# Patient Record
Sex: FEMALE | Race: WHITE | NOT HISPANIC OR LATINO | Employment: OTHER | URBAN - METROPOLITAN AREA
[De-identification: names, ages, dates, MRNs, and addresses within clinical notes are randomized per-mention and may not be internally consistent; named-entity substitution may affect disease eponyms.]

---

## 2017-01-11 DIAGNOSIS — I10 ESSENTIAL (PRIMARY) HYPERTENSION: ICD-10-CM

## 2017-01-11 DIAGNOSIS — M19.90 OSTEOARTHRITIS: ICD-10-CM

## 2017-01-11 DIAGNOSIS — M54.9 DORSALGIA: ICD-10-CM

## 2017-01-11 DIAGNOSIS — M85.80 OTHER SPECIFIED DISORDERS OF BONE DENSITY AND STRUCTURE, UNSPECIFIED SITE: ICD-10-CM

## 2017-02-02 ENCOUNTER — ALLSCRIPTS OFFICE VISIT (OUTPATIENT)
Dept: OTHER | Facility: OTHER | Age: 72
End: 2017-02-02

## 2017-02-02 LAB
FLUAV AG SPEC QL IA: NEGATIVE
INFLUENZA B AG (HISTORICAL): NEGATIVE

## 2017-02-06 ENCOUNTER — ALLSCRIPTS OFFICE VISIT (OUTPATIENT)
Dept: OTHER | Facility: OTHER | Age: 72
End: 2017-02-06

## 2017-03-20 ENCOUNTER — ALLSCRIPTS OFFICE VISIT (OUTPATIENT)
Dept: OTHER | Facility: OTHER | Age: 72
End: 2017-03-20

## 2017-05-27 ENCOUNTER — GENERIC CONVERSION - ENCOUNTER (OUTPATIENT)
Dept: OTHER | Facility: OTHER | Age: 72
End: 2017-05-27

## 2017-07-31 ENCOUNTER — GENERIC CONVERSION - ENCOUNTER (OUTPATIENT)
Dept: OTHER | Facility: OTHER | Age: 72
End: 2017-07-31

## 2017-08-14 LAB — HCV AB SER-ACNC: NEGATIVE

## 2017-08-21 ENCOUNTER — ALLSCRIPTS OFFICE VISIT (OUTPATIENT)
Dept: OTHER | Facility: OTHER | Age: 72
End: 2017-08-21

## 2017-08-21 DIAGNOSIS — M19.90 OSTEOARTHRITIS: ICD-10-CM

## 2017-08-21 DIAGNOSIS — I10 ESSENTIAL (PRIMARY) HYPERTENSION: ICD-10-CM

## 2017-08-21 DIAGNOSIS — E78.5 HYPERLIPIDEMIA: ICD-10-CM

## 2017-08-21 DIAGNOSIS — F41.9 ANXIETY DISORDER: ICD-10-CM

## 2017-08-21 DIAGNOSIS — M54.9 DORSALGIA: ICD-10-CM

## 2017-10-11 ENCOUNTER — ALLSCRIPTS OFFICE VISIT (OUTPATIENT)
Dept: OTHER | Facility: OTHER | Age: 72
End: 2017-10-11

## 2017-10-18 ENCOUNTER — GENERIC CONVERSION - ENCOUNTER (OUTPATIENT)
Dept: OTHER | Facility: OTHER | Age: 72
End: 2017-10-18

## 2017-11-01 ENCOUNTER — GENERIC CONVERSION - ENCOUNTER (OUTPATIENT)
Dept: OTHER | Facility: OTHER | Age: 72
End: 2017-11-01

## 2017-11-15 ENCOUNTER — GENERIC CONVERSION - ENCOUNTER (OUTPATIENT)
Dept: OTHER | Facility: OTHER | Age: 72
End: 2017-11-15

## 2017-11-29 ENCOUNTER — GENERIC CONVERSION - ENCOUNTER (OUTPATIENT)
Dept: OTHER | Facility: OTHER | Age: 72
End: 2017-11-29

## 2017-12-06 ENCOUNTER — GENERIC CONVERSION - ENCOUNTER (OUTPATIENT)
Dept: INTERNAL MEDICINE CLINIC | Facility: CLINIC | Age: 72
End: 2017-12-06

## 2017-12-26 ENCOUNTER — GENERIC CONVERSION - ENCOUNTER (OUTPATIENT)
Dept: INTERNAL MEDICINE CLINIC | Facility: CLINIC | Age: 72
End: 2017-12-26

## 2018-01-03 ENCOUNTER — GENERIC CONVERSION - ENCOUNTER (OUTPATIENT)
Dept: INTERNAL MEDICINE CLINIC | Age: 73
End: 2018-01-03

## 2018-01-11 NOTE — PROGRESS NOTES
Assessment    1  Benign essential HTN (401 1) (I10)   2  Initial Medicare annual wellness visit (V70 0) (Z00 00)   3  Anxiety (300 00) (F41 9)    Plan  Anxiety    · LORazepam 0 5 MG Oral Tablet; TAKE TABLET Daily as needed  Influenza vaccine needed    · Fluzone Quadrivalent 0 5 ML Intramuscular Suspension Prefilled Syringe    Discussion/Summary    Patient is up to date with screening exams   denies new complaints  vaccines up to date  Impression: Initial Annual Wellness Visit, with preventive exam as well as age and risk appropriate counseling completed  Cardiovascular screening and counseling: counseling was given on maintaining a healthy diet and counseling was given on maintaining a healthy weight  Colorectal cancer screening and counseling: screening is current  Breast cancer screening and counseling: screening is current  Cervical cancer screening and counseling: screening is current  Osteoporosis screening and counseling: screening is current, counseling was given on obtaining adequate amounts of calcium and vitamin D on a daily basis and counseling was given on the importance of regular weightbearing exercise  HIV screening and counseling: screening not indicated  Hepatitis C Screening:  The patient agrees to Hepatitis C screening  Immunizations: the risks and benefits of influenza vaccination were discussed with the patient, the lifetime pneumococcal vaccine has been completed, Zostavax vaccination up to date and Td vaccination up to date  Advance Directive Planning: paperwork and instructions were given to the patient  Advice and education were given regarding skin self-exam       Chief Complaint  Patient is here for AWV exam  Pt does not have any new complains at the moment        History of Present Illness  HPI: patient here for initial AWV - pt is requesting copy to send to her insurance  vaccines up to date - flu vaccine today     mammo and colonoscopy up to date - pt is followed by Dr Glynn Rollins at [facility] for hx of breast cancer and sees her yearly      Welcome to [employer] and Wellness Visits: The patient is being seen for the initial annual wellness visit  Medicare Screening and Risk Factors   Hospitalizations: no previous hospitalizations  Medicare Screening Tests Risk Questions   Osteoporosis risk assessment: , female gender and over 48years of age  Drug and Alcohol Use: The patient is a former cigarette smoker  She has smoked for 1967 year(s) and pt quit smoking in 1968  The patient reports never drinking alcohol  Alcohol concern:   The patient has no concerns about alcohol abuse  She has never used illicit drugs  Diet and Physical Activity: Current diet includes 2 servings of fruit per day, 1 servings of vegetables per day, 1 servings of meat per day, 1-2 servings of whole grains per day, 1-2 servings of dairy products per day, 0 cups of coffee per day, 0 cups of tea per day, 0 cans of regular soda per day and 0 cans of diet soda per day  She exercises 5-6 times per week  Exercise: walking 3-4 hours per week, walks dog in summer, goes to  for exercise classes (silver sneakers/dance class)  Mood Disorder and Cognitive Impairment Screening: PHQ-9 Depression Scale   Over the past 2 weeks, how often have you been bothered by the following problems? 1 ) Little interest or pleasure in doing things? Several days  2 ) Feeling down, depressed or hopeless? Several days  3 ) Trouble falling asleep or sleeping too much? Not at all    4 ) Feeling tired or having little energy? Half the days or more  5 ) Poor appetite or overeating? Several days  6 ) Feeling bad about yourself, or that you are a failure, or have let yourself or your family down? Several days  7 ) Trouble concentrating on things, such as reading a newspaper or watching television? Several days     8 ) Moving or speaking so slowly that other people could have noticed, or the opposite, moving or speaking faster than usual? Not at all  TOTAL SCORE: 7, severity of depression is mild  How difficult have these problems made it for you to do your work, take care of things at home, or get along with people? Not at all  She reports feeling down, depressed, or hopeless over the past two weeks  She reports feeling little interest or pleasure in doing things over the past two weeks  Cognitive impairment screening: difficulty learning/retaining new information, difficulty handling complex tasks, denies difficulty with reasoning, denies difficulty with spatial ability and orientation, denies difficulty with language and denies difficulty with behavior  Functional Ability/Level of Safety: Hearing is slightly decreased, slightly decreased in the right ear, slightly decreased in the left ear and notices worse in setting with background noise  She reports hearing difficulties  She does not use a hearing aid  The patient is currently able to participate in social activities with limitations, but able to do activities of daily living without limitations and able to drive without limitations  Activities of daily living details: does not need help using the phone, no transportation help needed, does not need help shopping, no meal preparation help needed, does not need help doing housework, does not need help doing laundry, does not need help managing medications and does not need help managing money  Injury History: polypharmacy, no alcohol use, no mobility impairment, no antidepressant use, no deconditioning, no postural hypotension, no sedative use, visual impairment, no urinary incontinence, antihypertensive use, no cognitive impairment, up and go test was normal and no previous fall   no falls in the past year  Home safety risk factors:  loose rugs, but no unfamiliar surroundings, no poor household lighting, no uneven floors, no household clutter, grab bars in the bathroom and handrails on the stairs  Advance Directives: Advance directives: will give form today, but no living will  Co-Managers and Medical Equipment/Suppliers: See Patient Care Team   Preventive Quality Program 65 and Older: Falls Risk: The patient fell no falls times in the past 12 months  The patient is currently asymptomatic Symptoms Include: no confusion, no lightheadedness, no vertigo, no syncope, no impaired balance, no visual problems, no leg weakness, no recurring falls and no recent fall  Associated symptoms:  No associated symptoms are reported  Urinary Incontinence Symptoms includes: urinary frequency, but no urinary urgency, no urinary hesitancy, no dysuria and no weak stream    pt relates this to her diuretic  Review of Systems    Constitutional: no fever, no chills, no malaise and no fatigue  Head and Face: no facial pain  Eyes: no eye pain, eyes not red, no watery discharge from the eyes and no itching of the eyes  ENT: no earache and no nasal congestion  Cardiovascular: no chest pain, no palpitations and no lightheadedness  Respiratory: no shortness of breath, no cough and no dry cough  Gastrointestinal: no abdominal pain, no nausea, no diarrhea and no constipation  Genitourinary: urinary frequency and stress urinary incontinence, but no dysuria, no urinary urgency, no flank pain and no foul-smelling vaginal discharge  Musculoskeletal: b/l knee pain, pain R SI joint, but no diffuse joint pain, no generalized muscle aches and no joint swelling  Integumentary and Breasts: no rashes, no skin wound, no edema and no skin ulcer  Neurological: occasional sinus headaches, but no headache, no confusion and no leg weakness  Psychiatric: no insomnia and no anxiety  Endocrine: no night sweats and no muscle weakness  Hematologic and Lymphatic: no swollen glands, no tendency for easy bleeding and no tendency for easy bruising  Yes, the patient is satisfied with Her life     No, the patient has not dropped any activities or interests  Yes, the patient feels life is empty  Point = 1  No, the patient does not often get bored  No, the patient is not hopeful about the future  Point = 1  No, the patient is not bothered by thoughts in their head  Yes, the patient is in good spirits most of the time  No, the patient is not afraid something bad is going to happen to them  Yes, the patient feels happy most of the time  No, the patient does not often feel helpless  No, the patient albright not often get restless and fidgety  No, the patient likes to go out and try new things  No, the patient does not worry about the future  Yes, the patient feels that they have more problems with their memory then most  Point = 1  Yes, the patient thinks its wonderful to be alive now  No, the patient does not feel downhearted or blue  No, the patient does not feel worthless the way they are currently  No, the patient does not find life to be very exciting  Point = 1  No, the patient does not worry a lot about the past    No, it is not hard for the patient to get started on new projects  Yes, the patient feels full of energy  No, the patient does not feel that most people are better off then they are  No, the patient does not frequently get upset about the little things  No, the patient does not frequently feel like crying  Yes, the patient finds it hard to concentrate  Point = 1  Yes, the patient enjoys getting up in the morning  No, the patient enjoys social gatherings  Yes, the patient finds it easy to make decisions  No, the patient does not feel thier mind is clear as it used to be  Point = 1  Score 6   Normal 0 - 9, Mild Depression 10 - 19, Severe Depression 20 - 30      Active Problems    1  Abrasion of right cornea, sequela (906 2) (S05 01XS)   2  Allergy desensitization therapy (V07 1) (Z51 6)   3  Allergy to cats (477 8) (J30 81)   4  Allergy to dogs (477 2) (J30 81)   5   Allergy to mold (V15 09) (Z91 048)   6  Allergy to trees (477 0) (J30 1)   7  Anxiety (300 00) (F41 9)   8  Benign essential HTN (401 1) (I10)   9  Chronic back pain (724 5,338 29) (M54 9,G89 29)   10  Chronic interstitial cystitis (595 1) (N30 10)   11  Chronic sinusitis (473 9) (J32 9)   12  Deviated nasal septum (470) (J34 2)   13  Hot flashes (782 62) (R23 2)   14  Hyperlipidemia (272 4) (E78 5)   15  Hypokalemia (276 8) (E87 6)   16  Multiple allergies (V15 09) (Z88 9)   17  Osteoarthritis (715 90) (M19 90)   18  Osteopenia (733 90) (M85 80)   19  Spondylosis of lumbosacral joint without myelopathy (721 3) (M47 817)    Past Medical History    · History of Bilateral low back pain without sciatica (724 2) (M54 5)   · History of Chronically dry eyes, bilateral (375 15) (T22 349)   · History of Disorder of bone and cartilage (733 90) (M89 9,M94 9)   · History of Dyspareunia, female (625 0) (N94 10)   · History of Eczema, unspecified type (692 9) (L30 9)   · History of Environmental allergies (V15 09) (Z91 09)   · History of acute sinusitis (V12 69) (Z87 09)   · History of chest pain (V13 89) (H36 910)   · History of colonic polyps (V12 72) (Z86 010)   · History of esophageal reflux (V12 79) (Z87 19)   · History of fever (V13 89) (M51 997)   · History of hemorrhoids (V13 89) (Z87 19)   · History of malignant neoplasm of breast (V10 3) (Z85 3)   · History of pelvic inflammatory disease (V13 29) (Z87 42)   · History of senile atrophic vaginitis (V13 29) (Z87 42)   · History of shortness of breath (V13 89) (T26 309)   · History of uterine leiomyoma (V13 29) (Z86 018)   · History of varicella (V12 09) (Z86 19)   · 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)   · History of Immunotherapy   · History of Palpitations (785 1) (R00 2)   · History of Sacroiliac pain (724 6) (M53 3)    The active problems and past medical history were reviewed and updated today        Surgical History    · History of Breast Reconstruction With Implant Prosthesis Right Breast   · History of Hemorrhoidectomy   · History of Hysterectomy   · History of Mastectomy Right Breast    The surgical history was reviewed and updated today  Family History  Mother    · Family history of ischemic heart disease (V17 3) (Z80 55)  Father    · Family history of ischemic heart disease (V17 3) (Z82 49)   · Family history of kidney disease (V22 75) (Z80 3)  Sister    · Family history of malignant neoplasm of uterus (V16 49) (Z80 49)  Maternal Grandmother    · Family history of liver disease (V18 59) (Z83 79)  Paternal Aunt    · Family history of colon cancer (V16 0) (Z80 0)    The family history was reviewed and updated today  Social History    · Former smoker (Z93 37) (P76 003)   · smoked for 1 year at age 24   · No alcohol use   · No illicit drug use  The social history was reviewed and updated today  Current Meds   1  Allegra-D 24 Hour 180-240 MG TB24; take 1 tablet daily as needed; Therapy: (Recorded:10Oct2017) to Recorded   2  Artificial Tears 1 4 % Ophthalmic Solution; apply 4-6 times daily; Therapy: (Recorded:10Oct2017) to Recorded   3  Aspirin 81 MG TABS; Take 1 tablet daily; Therapy: (Recorded:19Oct2016) to Recorded   4  Azo Tabs 95 MG Oral Tablet; Take 1 tablet daily; Therapy: (Recorded:50Iuw1876) to Recorded   5  B-6 100 MG Oral Tablet; Take 1 tablet daily; Therapy: 29Qmq7504 to Recorded   6  Biotin Maximum Strength 78446 MCG Oral Tablet; TAKE 1 TABLET DAILY; Therapy: (Recorded:10Oct2017) to Recorded   7  Calcium 500 MG TABS; take 2 tablet daily; Therapy: (Recorded:11Oct2017) to Recorded   8  Sera-C/Bioflavonoids TABS; TAKE 1 TABLET DAILY; Therapy: (Recorded:11Evk6510) to Recorded   9  Fluticasone Propionate 50 MCG/ACT Nasal Suspension; USE 2 SPRAYS IN EACH   NOSTRIL ONCE DAILY; Therapy: (Recorded:56Wxe0254) to Recorded   10  Gabapentin 100 MG Oral Capsule; TAKE 2 CAPSULE Daily; Therapy: (Recorded:10Oct2017) to Recorded   11  Glucosamine-Chondroitin 750-600 MG Oral Tablet Chewable; CHEW 1 TABLET DAILY; Therapy: (Recorded:19Oct2016) to Recorded   12  LORazepam 0 5 MG Oral Tablet; TAKE TABLET Daily as needed; Last Rx:73Bwj2941    Ordered   13  Meloxicam 7 5 MG Oral Tablet; Take 1 tablet daily  Requested for: 99BAM2233; Last    Rx:07Auv8902 Ordered   14  Metoprolol Tartrate 25 MG Oral Tablet; take 0 5 tablet daily  Requested for: 95VTS0351;    Last Rx:17Jia1024 Ordered   15  Multi Vitamin/Minerals Oral Tablet; TAKE 1 TABLET DAILY; Therapy: (Recorded:19Oct2016) to Recorded   16  Olopatadine HCl - 0 6 % Nasal Solution; INSTILL 2 SPRAY Daily each nostril  Requested    for: 31VBC7472; Last Rx:23Jan2017 Ordered   17  Omega 3-6-9 Fatty Acids CAPS; TAKE 1000 MG Daily; Therapy: (Recorded:19Oct2016) to Recorded   18  Premarin CREA; USE AS DIRECTED; Therapy: (Recorded:10Oct2017) to Recorded   19  Probiotic CAPS; take 1 capsule daily; Therapy: (Recorded:10Oct2017) to Recorded   20  Promethazine-DM 6 25-15 MG/5ML Oral Syrup; SWALLOW 5 ML 4 times daily as needed    for cough from allergies  Requested for: 97Ijn5854; Last Rx:02Msa1905 Ordered   21  Red Yeast Rice 600 MG Oral Capsule; take 1 capsule daily; Therapy: (Recorded:19Oct2016) to Recorded   22  Restasis 0 05 % Ophthalmic Emulsion; INSTILL 1 DROP IN [facility] EYE TWICE DAILY; Therapy: (Recorded:11Oct2017) to Recorded   23  Systane GEL; Apply at bedtime; Therapy: (Recorded:11Oct2017) to Recorded   24  Triamterene-HCTZ 75-50 MG Oral Tablet; Take 1 tablet daily  Requested for: 96MVJ7614;    Last Rx:99Qal9254 Ordered   25  Vitamin D3 2000 UNIT Oral Tablet; Take 1 tablet daily; Therapy: (Recorded:11Oct2017) to Recorded   26  Vitamin E 400 UNIT Oral Capsule; take 1 capsule daily; Therapy: (Recorded:19Oct2016) to Recorded    The medication list was reviewed and updated today  Allergies    1  diltiazem   2  moxifloxacin   3  Penicillins   4  predniSONE   5  sulfa   6  Tetracyclines   7  Trimpex   8  Avelox TABS    9  Shellfish   10  Animal dander - Cats   11  Animal dander - Dogs   12  Grass   13  Mold   14  Ragweed   15  Seasonal   16  Trees    Immunizations   1 2    Influenza  Temporarily Deferred: Pt requests deferral, Received at [facility] Sept 2016, As of: [id number], Defer for 1 Years 29-Sep-2015    PCV  22-Jun-2015     PPSV  01-Jan-2007     Tetanus  21-Oct-2008     Zoster  10-Ezequiel-2009      Vitals  Signs    Temperature: 98 6 F, Oral  Heart Rate: 80  Systolic: 547, LUE, Sitting  Diastolic: 80, LUE, Sitting  Height: 4 ft 0 5 in  Weight: 171 lb   BMI Calculated: 51 11  BSA Calculated: 1 5  O2 Saturation: 97    Physical Exam    Constitutional   General appearance: No acute distress, well appearing and well nourished  Eyes   Conjunctiva and lids: No swelling, erythema or discharge  Ears, Nose, Mouth, and Throat   External inspection of ears and nose: Normal     Pulmonary   Respiratory effort: No increased work of breathing or signs of respiratory distress  Auscultation of lungs: Clear to auscultation  Cardiovascular   Auscultation of heart: Normal rate and rhythm, normal S1 and S2, without murmurs  Examination of extremities for edema and/or varicosities: Normal     Musculoskeletal   Gait and station: Normal     Inspection/palpation of joints, bones, and muscles: Normal     Skin   Skin and subcutaneous tissue: Normal without rashes or lesions  Psychiatric   Orientation to person, place, and time: Normal     Mood and affect: Normal        Results/Data  Falls Risk Assessment (Dx Z13 89 Screen for Neurologic Disorder) 11Oct2017 02:19PM User, Tanium     Test Name Result Flag Reference   Falls Risk      No falls in the past year     PHQ-2 Adult Depression Screening 67QJB4025 02:19PM User, Tanium     Test Name Result Flag Reference   PHQ-2 Adult Depression Score 0     Over the last two weeks, how often have you been bothered by any of the following problems?   Little interest or pleasure in doing things: Not at all - 0  Feeling down, depressed, or hopeless: Not at all - 0   PHQ-2 Adult Depression Screening Negative         Health Management  Depression screening   PHevery-2 Adult Depression Screening; every 1 year; Last 30PLB7276; Next Due:  29PDH9455; Active  History of Screening for genitourinary condition   *VB - Urinary Incontinence Screen (Dx Z13 89 Screen for UI); every 1 year; Last  19Vyp7499; Next Due: 49Agj5730; Active  History of Screening for malignant neoplasm of breast   * MAMMO SCREENING BILATERAL W CAD; every 1 year; Last 78Zzd4122; Next Due:  80Gvt3197; Overdue  History of Screening for neurological condition   Falls Risk Assessment (Dx Z13 89 Screen for Neurologic Disorder); every 1 year; Last  43SRC2874; Next Due: 39VSF1449; Active  Osteoarthritis   * DXA BONE DENSITY SPINE HIP AND PELVIS; every 2 years; Last 39Rbs7321; Next  Due: 63SFF0147; Active  Osteopenia   * DXA BONE DENSITY SPINE HIP AND PELVIS; every 2 years; Last 90Ift1102; Next  Due: 61MAS8095; Active  Screening for malignant neoplasm of colon   COLONOSCOPY; every 5 years; Last 73QCR1138; Next Due: 27DQV3745; Active    Future Appointments    Date/Time Provider Specialty Site   02/05/2018 02:15 PM Deandra Chandler MD Internal Medicine [facility]   10/16/2017 02:45  [address] [place] [address], Nurse Schedule  [facility]     Signatures   Electronically signed by : Darren Garcia, [facility]; Oct 11 2017  3:05PM EST                       (Author)    Electronically signed by :  Linda Marroquin MD; Oct 12 2017  8:41AM EST                       (Author)

## 2018-01-12 VITALS
TEMPERATURE: 98.4 F | HEIGHT: 55 IN | HEART RATE: 76 BPM | DIASTOLIC BLOOD PRESSURE: 62 MMHG | SYSTOLIC BLOOD PRESSURE: 122 MMHG | BODY MASS INDEX: 26.01 KG/M2 | OXYGEN SATURATION: 98 % | WEIGHT: 112.38 LBS

## 2018-01-13 VITALS
OXYGEN SATURATION: 97 % | WEIGHT: 171 LBS | SYSTOLIC BLOOD PRESSURE: 124 MMHG | BODY MASS INDEX: 39.57 KG/M2 | DIASTOLIC BLOOD PRESSURE: 80 MMHG | HEIGHT: 55 IN | TEMPERATURE: 98.6 F | HEART RATE: 80 BPM

## 2018-01-13 VITALS
HEIGHT: 59 IN | BODY MASS INDEX: 22.88 KG/M2 | TEMPERATURE: 99.1 F | DIASTOLIC BLOOD PRESSURE: 72 MMHG | OXYGEN SATURATION: 95 % | HEART RATE: 92 BPM | SYSTOLIC BLOOD PRESSURE: 112 MMHG | WEIGHT: 113.5 LBS

## 2018-01-14 VITALS
SYSTOLIC BLOOD PRESSURE: 108 MMHG | OXYGEN SATURATION: 95 % | TEMPERATURE: 98.4 F | DIASTOLIC BLOOD PRESSURE: 72 MMHG | BODY MASS INDEX: 26.93 KG/M2 | HEIGHT: 55 IN | WEIGHT: 116.38 LBS | HEART RATE: 84 BPM

## 2018-01-14 VITALS
HEART RATE: 84 BPM | WEIGHT: 114 LBS | OXYGEN SATURATION: 96 % | SYSTOLIC BLOOD PRESSURE: 120 MMHG | TEMPERATURE: 98.3 F | HEIGHT: 55 IN | DIASTOLIC BLOOD PRESSURE: 60 MMHG | BODY MASS INDEX: 26.38 KG/M2

## 2018-01-18 ENCOUNTER — GENERIC CONVERSION - ENCOUNTER (OUTPATIENT)
Dept: INTERNAL MEDICINE CLINIC | Facility: CLINIC | Age: 73
End: 2018-01-18

## 2018-01-25 LAB
25(OH)D3 SERPL-MCNC: 57 NG/ML (ref 30–100)
BUN SERPL-MCNC: 26 MG/DL (ref 7–25)
BUN/CREAT SERPL: 25 (CALC) (ref 6–22)
CALCIUM SERPL-MCNC: 9.7 MG/DL (ref 8.6–10.4)
CHLORIDE SERPL-SCNC: 97 MMOL/L (ref 98–110)
CO2 SERPL-SCNC: 31 MMOL/L (ref 20–31)
CREAT SERPL-MCNC: 1.03 MG/DL (ref 0.6–0.93)
ERYTHROCYTE [DISTWIDTH] IN BLOOD BY AUTOMATED COUNT: 11.9 % (ref 11–15)
GLUCOSE SERPL-MCNC: 72 MG/DL (ref 65–99)
HCT VFR BLD AUTO: 39.4 % (ref 35–45)
HGB BLD-MCNC: 12.9 G/DL (ref 11.7–15.5)
MCH RBC QN AUTO: 28.9 PG (ref 27–33)
MCHC RBC AUTO-ENTMCNC: 32.7 G/DL (ref 32–36)
MCV RBC AUTO: 88.1 FL (ref 80–100)
PLATELET # BLD AUTO: 232 THOUSAND/UL (ref 140–400)
PMV BLD REES-ECKER: 11 FL (ref 7.5–12.5)
POTASSIUM SERPL-SCNC: 3.5 MMOL/L (ref 3.5–5.3)
RBC # BLD AUTO: 4.47 MILLION/UL (ref 3.8–5.1)
SL AMB EGFR AFRICAN AMERICAN: 63 ML/MIN/1.73M2
SL AMB EGFR NON AFRICAN AMERICAN: 55 ML/MIN/1.73M2
SODIUM SERPL-SCNC: 138 MMOL/L (ref 135–146)
TSH SERPL-ACNC: 1.48 MIU/L (ref 0.4–4.5)
WBC # BLD AUTO: 7.7 THOUSAND/UL (ref 3.8–10.8)

## 2018-01-31 ENCOUNTER — CLINICAL SUPPORT (OUTPATIENT)
Dept: INTERNAL MEDICINE CLINIC | Facility: CLINIC | Age: 73
End: 2018-01-31
Payer: COMMERCIAL

## 2018-01-31 DIAGNOSIS — J30.81 ALLERGIC TO ANIMAL DANDER: ICD-10-CM

## 2018-01-31 DIAGNOSIS — J30.1 ALLERGY TO TREES: ICD-10-CM

## 2018-01-31 DIAGNOSIS — Z91.048 ALLERGY TO MOLD: ICD-10-CM

## 2018-01-31 DIAGNOSIS — Z88.9 MULTIPLE ALLERGIES: ICD-10-CM

## 2018-01-31 PROCEDURE — 95117 IMMUNOTHERAPY INJECTIONS: CPT | Performed by: INTERNAL MEDICINE

## 2018-02-02 RX ORDER — LORAZEPAM 0.5 MG/1
TABLET ORAL DAILY
COMMUNITY
End: 2018-02-05 | Stop reason: SDUPTHER

## 2018-02-02 RX ORDER — LANOLIN ALCOHOL/MO/W.PET/CERES
50 CREAM (GRAM) TOPICAL 3 TIMES WEEKLY
COMMUNITY
End: 2018-08-01

## 2018-02-02 RX ORDER — CHOLECALCIFEROL (VITAMIN D3) 125 MCG
1 CAPSULE ORAL DAILY
COMMUNITY

## 2018-02-02 RX ORDER — MONTELUKAST SODIUM 10 MG/1
TABLET ORAL
COMMUNITY
Start: 2017-03-20 | End: 2018-08-01

## 2018-02-02 RX ORDER — PHENAZOPYRIDINE HYDROCHLORIDE 95 MG/1
1 TABLET ORAL DAILY
COMMUNITY

## 2018-02-02 RX ORDER — FLUTICASONE PROPIONATE 50 MCG
2 SPRAY, SUSPENSION (ML) NASAL DAILY
COMMUNITY

## 2018-02-02 RX ORDER — PYRIDOXINE HCL (VITAMIN B6) 100 MG
1 TABLET ORAL DAILY
COMMUNITY
Start: 2017-08-21 | End: 2018-02-28 | Stop reason: DRUGHIGH

## 2018-02-02 RX ORDER — DIAZEPAM 10 MG/1
TABLET ORAL
COMMUNITY
Start: 2017-11-21 | End: 2019-06-26 | Stop reason: CLARIF

## 2018-02-02 RX ORDER — POLYVINYL ALCOHOL 14 MG/ML
SOLUTION/ DROPS OPHTHALMIC
COMMUNITY
End: 2018-08-01

## 2018-02-02 RX ORDER — BIOTIN 10 MG
1 TABLET ORAL DAILY
COMMUNITY
End: 2018-02-28

## 2018-02-02 RX ORDER — TIZANIDINE 2 MG/1
2 TABLET ORAL EVERY 8 HOURS PRN
Refills: 0 | COMMUNITY
Start: 2017-12-07 | End: 2018-02-28 | Stop reason: ALTCHOICE

## 2018-02-02 RX ORDER — DEXTROMETHORPHAN HYDROBROMIDE AND PROMETHAZINE HYDROCHLORIDE 15; 6.25 MG/5ML; MG/5ML
SYRUP ORAL 4 TIMES DAILY PRN
COMMUNITY
End: 2019-06-26 | Stop reason: ALTCHOICE

## 2018-02-02 RX ORDER — CRANBERRY FRUIT EXTRACT 200 MG
1 CAPSULE ORAL DAILY
COMMUNITY

## 2018-02-02 RX ORDER — TRIAMTERENE AND HYDROCHLOROTHIAZIDE 75; 50 MG/1; MG/1
1 TABLET ORAL
COMMUNITY
Start: 2016-12-12 | End: 2018-02-05 | Stop reason: SDUPTHER

## 2018-02-02 RX ORDER — GABAPENTIN 100 MG/1
100 CAPSULE ORAL DAILY
COMMUNITY

## 2018-02-02 RX ORDER — DIAZEPAM 5 MG/1
TABLET ORAL
COMMUNITY
Start: 2017-11-21 | End: 2018-08-01

## 2018-02-02 RX ORDER — MELOXICAM 7.5 MG/1
1 TABLET ORAL DAILY
COMMUNITY
End: 2018-06-20 | Stop reason: SDUPTHER

## 2018-02-02 RX ORDER — VITAMIN E 268 MG
1 CAPSULE ORAL DAILY
COMMUNITY
End: 2020-12-02 | Stop reason: ALTCHOICE

## 2018-02-02 RX ORDER — CALCIUM CARBONATE 500(1250)
500 TABLET ORAL DAILY
COMMUNITY

## 2018-02-02 RX ORDER — CYCLOSPORINE 0.5 MG/ML
0.05 EMULSION OPHTHALMIC EVERY 12 HOURS
COMMUNITY
Start: 2013-12-06 | End: 2018-11-05 | Stop reason: SDUPTHER

## 2018-02-02 RX ORDER — CHOLECALCIFEROL (VITAMIN D3) 50 MCG
4 CAPSULE ORAL DAILY
COMMUNITY

## 2018-02-02 RX ORDER — OLOPATADINE HYDROCHLORIDE 665 UG/1
SPRAY NASAL DAILY
COMMUNITY
End: 2018-08-01

## 2018-02-02 RX ORDER — CYCLOSPORINE 0.5 MG/ML
1 EMULSION OPHTHALMIC 2 TIMES DAILY
COMMUNITY
End: 2018-08-01

## 2018-02-02 RX ORDER — VERAPAMIL HYDROCHLORIDE 40 MG/1
20 TABLET ORAL DAILY
Refills: 3 | COMMUNITY
Start: 2017-12-06

## 2018-02-05 ENCOUNTER — OFFICE VISIT (OUTPATIENT)
Dept: INTERNAL MEDICINE CLINIC | Facility: CLINIC | Age: 73
End: 2018-02-05
Payer: COMMERCIAL

## 2018-02-05 VITALS
DIASTOLIC BLOOD PRESSURE: 78 MMHG | HEART RATE: 86 BPM | WEIGHT: 113 LBS | HEIGHT: 59 IN | TEMPERATURE: 98.5 F | BODY MASS INDEX: 22.78 KG/M2 | SYSTOLIC BLOOD PRESSURE: 134 MMHG | OXYGEN SATURATION: 98 %

## 2018-02-05 DIAGNOSIS — F41.9 ANXIETY: ICD-10-CM

## 2018-02-05 DIAGNOSIS — I10 ESSENTIAL HYPERTENSION: ICD-10-CM

## 2018-02-05 DIAGNOSIS — E78.01 FAMILIAL HYPERCHOLESTEROLEMIA: Primary | ICD-10-CM

## 2018-02-05 PROCEDURE — 99214 OFFICE O/P EST MOD 30 MIN: CPT | Performed by: INTERNAL MEDICINE

## 2018-02-05 RX ORDER — LORAZEPAM 0.5 MG/1
0.5 TABLET ORAL ONCE
Qty: 30 TABLET | Refills: 0 | Status: SHIPPED | OUTPATIENT
Start: 2018-02-05 | End: 2018-04-24 | Stop reason: SDUPTHER

## 2018-02-05 RX ORDER — TRIAMTERENE AND HYDROCHLOROTHIAZIDE 75; 50 MG/1; MG/1
1 TABLET ORAL ONCE
Qty: 90 TABLET | Refills: 0 | Status: SHIPPED | OUTPATIENT
Start: 2018-02-05 | End: 2018-04-24 | Stop reason: SDUPTHER

## 2018-02-05 NOTE — PROGRESS NOTES
Assessment/Plan:  Hypertension   blood pressure is stable so will continue the present dose of antihypertensive medicine  Will check renal function test before next visit       mild hyperlipidemia     patient is taking over-the-counter red yeast rice  Last lipid profile was within normal limit  Will recommend to repeat  lipid profile before next visit     Diagnoses and all orders for this visit:    Familial hypercholesterolemia  -     triamterene-hydrochlorothiazide (MAXZIDE) 75-50 MG per tablet; Take 1 tablet by mouth once for 1 dose    Essential hypertension    Anxiety  -     LORazepam (ATIVAN) 0 5 mg tablet; Take 1 tablet (0 5 mg total) by mouth once for 1 dose    Other orders  -     Fexofenadine-Pseudoephedrine (ALLEGRA-D 24 HOUR PO); Take 1 tablet by mouth daily as needed  -     polyvinyl alcohol (LIQUIFILM TEARS) 1 4 % ophthalmic solution; Apply to eye  -     aspirin 81 MG tablet; Take 1 tablet by mouth daily  -     phenazopyridine (PYRIDIUM) 95 MG tablet; Take 1 tablet by mouth daily  -     Pyridoxine HCl (VITAMIN B-6) 100 MG TABS; Take 1 tablet by mouth daily  -     Biotin (BIOTIN MAXIMUM STRENGTH) 10 MG TABS; Take 1 tablet by mouth daily  -     Calcium 500 MG tablet; Take 2 tablets by mouth daily  -     Carboxymethylcellulose Sod PF 1 % ophthalmic solution; 1 drop  -     Cholecalciferol 1000 units capsule; Take 1,500 Units by mouth  -     conjugated estrogens (PREMARIN) vaginal cream; Apply a pea size amount of the estrogen cream to the opening of the vagina three nights per week  -     cycloSPORINE (RESTASIS) 0 05 % ophthalmic emulsion; Apply 0 05 % to eye  -     diazepam (VALIUM) 10 mg tablet; 10 mg compounded suppositoriesPlace 1 vaginally daily prn  -     diazepam (VALIUM) 5 mg tablet; 5 mg compounded suppositoriesPlace 1 vaginally 1-2 daily  -     fluticasone (FLONASE) 50 mcg/act nasal spray; 2 sprays into each nostril daily  -     gabapentin (NEURONTIN) 100 mg capsule;  Take 2 capsules by mouth daily  -     Glucosamine-Chondroitin 750-600 MG CHEW; Chew 1 tablet daily  -     Discontinue: LORazepam (ATIVAN) 0 5 mg tablet; Take by mouth Daily  -     meloxicam (MOBIC) 7 5 mg tablet; Take 1 tablet by mouth daily  -     Discontinue: metoprolol tartrate (LOPRESSOR) 25 mg tablet; Take 0 5 tablets by mouth daily  -     montelukast (SINGULAIR) 10 mg tablet; TK 1 T PO D  -     Multiple Vitamins-Minerals (MULTI VITAMIN/MINERALS) TABS; Take 1 tablet by mouth daily  -     Olopatadine HCl 0 6 % SOLN; into each nostril Daily  -     HM OMEGA-3-6-9 FATTY ACIDS CAPS; Take by mouth Daily  -     Probiotic Product (PROBIOTIC-10) CAPS; Take 1 capsule by mouth daily  -     promethazine-dextromethorphan (PHENERGAN-DM) 6 25-15 mg/5 mL oral syrup; Take by mouth 4 times daily  -     Red Yeast Rice Extract (GNP RED YEAST RICE) 600 MG CAPS; Take 1 capsule by mouth daily  -     pyridoxine (VITAMIN B6) 50 mg tablet; Take 50 mg by mouth 3 (three) times a week    -     cycloSPORINE (RESTASIS) 0 05 % ophthalmic emulsion; Apply 1 drop to eye 2 (two) times a day  -     Polyethyl Glycol-Propyl Glycol (SYSTANE) 0 4-0 3 % GEL; Apply to eye  -     tiZANidine (ZANAFLEX) 2 mg tablet; Take 2 mg by mouth every 8 (eight) hours as needed  -     Discontinue: triamterene-hydrochlorothiazide (MAXZIDE) 75-50 MG per tablet; Take 1 tablet by mouth  -     verapamil (CALAN) 40 mg tablet;   -     Cholecalciferol (VITAMIN D3) 2000 units TABS; Take 1 tablet by mouth daily  -     vitamin E, tocopherol, 400 units capsule; Take 1 capsule by mouth daily          Subjective:  No new complaints         Patient ID: Bentley Ordaz is a 71 y o  female  Hypertension   This is a chronic problem  The current episode started more than 1 year ago  Pertinent negatives include no anxiety, chest pain, headaches, neck pain, palpitations, peripheral edema or shortness of breath  There are no associated agents to hypertension   There are no known risk factors for coronary artery disease  Past treatments include calcium channel blockers  The current treatment provides significant improvement  There is no history of angina, PVD or retinopathy  Identifiable causes of hypertension include chronic renal disease  The following portions of the patient's history were reviewed and updated as appropriate: allergies, current medications, past family history, past medical history, past social history, past surgical history and problem list     Review of Systems   Constitutional: Negative for fatigue and fever  HENT: Negative for congestion, ear discharge, ear pain, postnasal drip, sinus pressure, sore throat, tinnitus and trouble swallowing  Eyes: Negative for discharge, itching and visual disturbance  Respiratory: Negative for cough and shortness of breath  Cardiovascular: Negative for chest pain and palpitations  Gastrointestinal: Negative for abdominal pain, diarrhea, nausea and vomiting  Endocrine: Negative for cold intolerance and polyuria  Genitourinary: Negative for difficulty urinating, dysuria and urgency  Musculoskeletal: Negative for arthralgias and neck pain  Skin: Negative for rash  Allergic/Immunologic: Negative for environmental allergies  Neurological: Negative for dizziness, weakness and headaches  Psychiatric/Behavioral: The patient is not nervous/anxious  No past medical history on file        Current Outpatient Prescriptions:     aspirin 81 MG tablet, Take 1 tablet by mouth daily, Disp: , Rfl:     Biotin (BIOTIN MAXIMUM STRENGTH) 10 MG TABS, Take 1 tablet by mouth daily, Disp: , Rfl:     Calcium 500 MG tablet, Take 2 tablets by mouth daily, Disp: , Rfl:     Carboxymethylcellulose Sod PF 1 % ophthalmic solution, 1 drop, Disp: , Rfl:     Cholecalciferol (VITAMIN D3) 2000 units TABS, Take 1 tablet by mouth daily, Disp: , Rfl:     Cholecalciferol 1000 units capsule, Take 1,500 Units by mouth, Disp: , Rfl:     conjugated estrogens (PREMARIN) vaginal cream, Apply a pea size amount of the estrogen cream to the opening of the vagina three nights per week , Disp: , Rfl:     cycloSPORINE (RESTASIS) 0 05 % ophthalmic emulsion, Apply 0 05 % to eye, Disp: , Rfl:     cycloSPORINE (RESTASIS) 0 05 % ophthalmic emulsion, Apply 1 drop to eye 2 (two) times a day, Disp: , Rfl:     diazepam (VALIUM) 5 mg tablet, 5 mg compounded suppositoriesPlace 1 vaginally 1-2 daily, Disp: , Rfl:     Fexofenadine-Pseudoephedrine (ALLEGRA-D 24 HOUR PO), Take 1 tablet by mouth daily as needed, Disp: , Rfl:     fluticasone (FLONASE) 50 mcg/act nasal spray, 2 sprays into each nostril daily, Disp: , Rfl:     gabapentin (NEURONTIN) 100 mg capsule, Take 2 capsules by mouth daily, Disp: , Rfl:     Glucosamine-Chondroitin 750-600 MG CHEW, Chew 1 tablet daily, Disp: , Rfl:     HM OMEGA-3-6-9 FATTY ACIDS CAPS, Take by mouth Daily, Disp: , Rfl:     LORazepam (ATIVAN) 0 5 mg tablet, Take 1 tablet (0 5 mg total) by mouth once for 1 dose, Disp: 30 tablet, Rfl: 0    meloxicam (MOBIC) 7 5 mg tablet, Take 1 tablet by mouth daily, Disp: , Rfl:     Multiple Vitamins-Minerals (MULTI VITAMIN/MINERALS) TABS, Take 1 tablet by mouth daily, Disp: , Rfl:     Olopatadine HCl 0 6 % SOLN, into each nostril Daily, Disp: , Rfl:     phenazopyridine (PYRIDIUM) 95 MG tablet, Take 1 tablet by mouth daily, Disp: , Rfl:     Polyethyl Glycol-Propyl Glycol (SYSTANE) 0 4-0 3 % GEL, Apply to eye, Disp: , Rfl:     polyvinyl alcohol (LIQUIFILM TEARS) 1 4 % ophthalmic solution, Apply to eye, Disp: , Rfl:     Probiotic Product (PROBIOTIC-10) CAPS, Take 1 capsule by mouth daily, Disp: , Rfl:     promethazine-dextromethorphan (PHENERGAN-DM) 6 25-15 mg/5 mL oral syrup, Take by mouth 4 times daily, Disp: , Rfl:     pyridoxine (VITAMIN B6) 50 mg tablet, Take 50 mg by mouth 3 (three) times a week  , Disp: , Rfl:     Red Yeast Rice Extract (GNP RED YEAST RICE) 600 MG CAPS, Take 1 capsule by mouth daily, Disp: , Rfl:     triamterene-hydrochlorothiazide (MAXZIDE) 75-50 MG per tablet, Take 1 tablet by mouth once for 1 dose, Disp: 90 tablet, Rfl: 0    verapamil (CALAN) 40 mg tablet, , Disp: , Rfl: 3    vitamin E, tocopherol, 400 units capsule, Take 1 capsule by mouth daily, Disp: , Rfl:     diazepam (VALIUM) 10 mg tablet, 10 mg compounded suppositoriesPlace 1 vaginally daily prn, Disp: , Rfl:     montelukast (SINGULAIR) 10 mg tablet, TK 1 T PO D, Disp: , Rfl:     Pyridoxine HCl (VITAMIN B-6) 100 MG TABS, Take 1 tablet by mouth daily, Disp: , Rfl:     tiZANidine (ZANAFLEX) 2 mg tablet, Take 2 mg by mouth every 8 (eight) hours as needed, Disp: , Rfl: 0    Allergies   Allergen Reactions    Diltiazem Other (See Comments)     Reaction Date: [id number]; Category: Adverse Reaction;     Dog Epithelium Allergy Skin Test     Molds & Smuts     Other Other (See Comments)     Reaction Date: [id number]; Category: Adverse Reaction;     Penicillins Other (See Comments)     Reaction Date: [id number]; Category: Allergy;     Pollen Extract      Category: Allergy;     Shellfish-Derived Products     Short Ragweed Pollen Ext     Tree Extract     Moxifloxacin Rash     Reaction Date: [id number]; Category: Adverse Reaction;   Category: Adverse Reaction;     Prednisone Rash     Reaction Date: [id number]; Category: Allergy;     Sulfa Antibiotics Rash and Other (See Comments)     Reaction Date: [id number]; Category: Allergy; Reaction Date: [id number]; Category: Allergy;     Trimethoprim Rash     Other reaction(s): Contact Dermatitis  Reaction Date: [id number]; Category: Adverse Reaction;        Social History   No past surgical history on file  No family history on file  Objective:  /78 (BP Location: Left arm, Patient Position: Sitting, Cuff Size: Standard)   Pulse 86   Temp 98 5 °F (36 9 °C)   Ht 4' 10 5" (1 486 m)   Wt 51 3 kg (113 lb)   SpO2 98%   BMI 23 22 kg/m²   Body mass index is 23 22 kg/m²       Physical Exam Constitutional: She appears well-developed  HENT:   Head: Normocephalic  Right Ear: External ear normal    Left Ear: External ear normal    Mouth/Throat: Oropharynx is clear and moist    Eyes: Pupils are equal, round, and reactive to light  No scleral icterus  Neck: Normal range of motion  Neck supple  No tracheal deviation present  No thyromegaly present  Cardiovascular: Normal rate, regular rhythm and normal heart sounds  Pulmonary/Chest: Effort normal and breath sounds normal  No respiratory distress  She exhibits no tenderness  Abdominal: Soft  Bowel sounds are normal  She exhibits no mass  There is no tenderness  Musculoskeletal: Normal range of motion  Lymphadenopathy:     She has no cervical adenopathy  Neurological: She is alert  No cranial nerve deficit  Skin: Skin is warm  No erythema  Psychiatric: She has a normal mood and affect   Her behavior is normal

## 2018-02-20 LAB
BUN SERPL-MCNC: 17 MG/DL (ref 7–25)
BUN/CREAT SERPL: 18 (CALC) (ref 6–22)
CALCIUM SERPL-MCNC: 9.6 MG/DL (ref 8.6–10.4)
CHLORIDE SERPL-SCNC: 99 MMOL/L (ref 98–110)
CHOLEST SERPL-MCNC: 237 MG/DL
CHOLEST/HDLC SERPL: 2.2 (CALC)
CO2 SERPL-SCNC: 31 MMOL/L (ref 20–31)
CREAT SERPL-MCNC: 0.94 MG/DL (ref 0.6–0.93)
GLUCOSE SERPL-MCNC: 71 MG/DL (ref 65–99)
HDLC SERPL-MCNC: 109 MG/DL
LDLC SERPL CALC-MCNC: 109 MG/DL (CALC)
NONHDLC SERPL-MCNC: 128 MG/DL (CALC)
POTASSIUM SERPL-SCNC: 3.6 MMOL/L (ref 3.5–5.3)
SL AMB EGFR AFRICAN AMERICAN: 71 ML/MIN/1.73M2
SL AMB EGFR NON AFRICAN AMERICAN: 61 ML/MIN/1.73M2
SODIUM SERPL-SCNC: 139 MMOL/L (ref 135–146)
TRIGL SERPL-MCNC: 101 MG/DL

## 2018-02-28 ENCOUNTER — OFFICE VISIT (OUTPATIENT)
Dept: INTERNAL MEDICINE CLINIC | Facility: CLINIC | Age: 73
End: 2018-02-28
Payer: COMMERCIAL

## 2018-02-28 VITALS
HEART RATE: 83 BPM | BODY MASS INDEX: 22.5 KG/M2 | OXYGEN SATURATION: 98 % | SYSTOLIC BLOOD PRESSURE: 128 MMHG | TEMPERATURE: 98.6 F | HEIGHT: 59 IN | DIASTOLIC BLOOD PRESSURE: 70 MMHG | WEIGHT: 111.6 LBS

## 2018-02-28 DIAGNOSIS — I10 BENIGN ESSENTIAL HTN: ICD-10-CM

## 2018-02-28 DIAGNOSIS — J32.1 CHRONIC FRONTAL SINUSITIS: Primary | ICD-10-CM

## 2018-02-28 DIAGNOSIS — J34.2 DEVIATED NASAL SEPTUM: ICD-10-CM

## 2018-02-28 DIAGNOSIS — M54.50 CHRONIC BILATERAL LOW BACK PAIN WITHOUT SCIATICA: ICD-10-CM

## 2018-02-28 DIAGNOSIS — G89.29 CHRONIC BILATERAL LOW BACK PAIN WITHOUT SCIATICA: ICD-10-CM

## 2018-02-28 DIAGNOSIS — J30.81 ALLERGY TO ANIMAL DANDER: ICD-10-CM

## 2018-02-28 DIAGNOSIS — J30.1 ALLERGY TO TREES: ICD-10-CM

## 2018-02-28 DIAGNOSIS — N95.2 ATROPHIC VAGINITIS: ICD-10-CM

## 2018-02-28 DIAGNOSIS — M54.50 CHRONIC RIGHT-SIDED LOW BACK PAIN WITHOUT SCIATICA: ICD-10-CM

## 2018-02-28 DIAGNOSIS — F41.9 ANXIETY: ICD-10-CM

## 2018-02-28 DIAGNOSIS — N30.10 CHRONIC INTERSTITIAL CYSTITIS: ICD-10-CM

## 2018-02-28 DIAGNOSIS — G89.29 CHRONIC RIGHT-SIDED LOW BACK PAIN WITHOUT SCIATICA: ICD-10-CM

## 2018-02-28 PROBLEM — J32.9 CHRONIC SINUSITIS: Status: ACTIVE | Noted: 2017-01-18

## 2018-02-28 PROCEDURE — 99213 OFFICE O/P EST LOW 20 MIN: CPT | Performed by: INTERNAL MEDICINE

## 2018-02-28 PROCEDURE — 3078F DIAST BP <80 MM HG: CPT | Performed by: INTERNAL MEDICINE

## 2018-02-28 PROCEDURE — 3074F SYST BP LT 130 MM HG: CPT | Performed by: INTERNAL MEDICINE

## 2018-02-28 RX ORDER — AZITHROMYCIN 250 MG/1
500 TABLET, FILM COATED ORAL EVERY 24 HOURS
Refills: 0 | Status: CANCELLED | OUTPATIENT
Start: 2018-02-28

## 2018-02-28 NOTE — PROGRESS NOTES
Assessment/Plan:    1  Chronic Sinusitis  She is currently having sinus headaches without any ear pain  She reports she is very congested in her frontal nasal passage and has a lot of PND  Since there is no sinus pain and no unusual drainage, we feel antibiotics will not be helpful in this situation  We instructed her to use saline nasal rinse 3-4 times a day  She should also use steroid spray and nasal spray daily  She was instructed to follow up with her ENT specialist for local solutions  2  Hyperlipidemia  Her cholesterol was elevated at 237 on 2/19/18  Since her HDL was quite high, no new medications were indicated  She was advised to minimize usage of red meat in her diet and to switch to a more plant-based diet  3  Visual problems  She has bilateral cataracts and dry eyes  She follows up with her eye doctor for evaluation and treatment  4  Health maintenance  She will follow up with us in 4 weeks  Diagnoses and all orders for this visit:    Chronic frontal sinusitis    Deviated nasal septum    Benign essential HTN    Atrophic vaginitis    Chronic interstitial cystitis    Allergy to animal dander    Allergy to trees    Anxiety    Chronic bilateral low back pain without sciatica    Chronic right-sided low back pain without sciatica    Other orders  -     Cancel: azithromycin (ZITHROMAX) 250 mg tablet; Take 2 tablets (500 mg total) by mouth every 24 hours          Subjective:      Patient ID: Livan Clemens is a 71 y o  female  Sharyn Laura is a 71year old female who presents today for evaluation of a sinus infection  She is currently having sinus headaches without any ear pain  She reports she is very congested in her frontal nasal passage and has a lot of PND  She was retested for allergy serums by her ENT specialist and had a reaction at a very low dosage of the allergy serum  She reports she is allergic to many things including dogs, 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   Because of her many allergies, she takes 1/3rd tablet of Allegra-D only when going to work because [address] causes her sinus headaches and increases her blood pressure  She uses Flonase and saline nasal rinse daily in the morning and another nasal spray nightly  She reports that she has been treating her current episode of chronic sinusitis by zinc lozenges and chewing elderberries without any beneficial effects  She reports she has cataracts in her eyes  She also reports extremely dry eyes for which she uses tear drops  She notes she still has lower right back pain for which she takes Meloxicam and uses pain patches  She has interstitial cystitis which causes her pelvic pain and polyuria  She follows with her uro-gynecologist for this  The following portions of the patient's history were reviewed and updated as appropriate: allergies, current medications, past family history, past medical history, past social history, past surgical history and problem list     Review of Systems   Constitutional: Positive for activity change and fever  Negative for fatigue  HENT: Positive for congestion, facial swelling, postnasal drip, rhinorrhea, sinus pain and sinus pressure  Negative for ear pain, hearing loss, nosebleeds, sneezing and sore throat  Eyes: Positive for redness (allergic) and visual disturbance (cataracts)  Negative for pain  Respiratory: Negative  Cardiovascular: Positive for palpitations  Negative for chest pain and leg swelling  Gastrointestinal: Negative  Genitourinary: Positive for frequency, pelvic pain and urgency  Negative for difficulty urinating, dysuria and flank pain (interstial cystites)  Musculoskeletal: Positive for back pain and joint swelling (knees)  Negative for gait problem and myalgias  Skin: Negative  Neurological: Negative for dizziness, seizures, syncope, speech difficulty, light-headedness and numbness  Psychiatric/Behavioral: The patient is nervous/anxious  Objective:      /70 (BP Location: Left arm, Patient Position: Sitting, Cuff Size: Standard)   Pulse 83   Temp 98 6 °F (37 °C) (Oral)   Ht 4' 10 5" (1 486 m)   Wt 50 6 kg (111 lb 9 6 oz)   SpO2 98%   BMI 22 93 kg/m²          Physical Exam   Constitutional: She is oriented to person, place, and time  She appears well-developed and well-nourished  She appears distressed  HENT:   Head: Normocephalic and atraumatic  Eyes: Conjunctivae and EOM are normal  Pupils are equal, round, and reactive to light  Neck: Normal range of motion  Neck supple  No thyromegaly present  Cardiovascular: Normal rate, regular rhythm and normal heart sounds  No murmur heard  Pulmonary/Chest: Effort normal and breath sounds normal    Abdominal: Soft  Bowel sounds are normal  She exhibits mass  She exhibits no distension  There is no tenderness  There is no rebound and no guarding  Musculoskeletal: She exhibits no edema, tenderness or deformity  Neurological: She is alert and oriented to person, place, and time  She has normal reflexes  Skin: Skin is warm and dry  Psychiatric: She has a normal mood and affect  By signing my name below, Patricia Alexis attest that this documentation has been prepared under the direction and in the presence of Dr Nir Hemphill MD  Electronically signed: Irene Bryant  2/28/18    Provider Attestation:  I, Dr Nir Hemphill, personally performed the services described in this documentation  All medical record entries made by the irene were at my direction and in my presence  I have reviewed the chart and discharge instructions (if applicable) and agree that the record reflects my personal performance and is accurate and complete  Dr Nir Hemphill MD  2/28/18

## 2018-02-28 NOTE — PATIENT INSTRUCTIONS
1  She was instructed to use saline nasal rinse 3-4 times a day  She should also use steroid spray and nasal spray daily  2  She was instructed to follow up with her ENT specialist for local solutions  3  She was advised to minimize usage of red meat in her diet and to switch to a more plant-based diet  4  She will follow up with us in 4 weeks

## 2018-04-10 ENCOUNTER — OFFICE VISIT (OUTPATIENT)
Dept: INTERNAL MEDICINE CLINIC | Age: 73
End: 2018-04-10
Payer: COMMERCIAL

## 2018-04-10 ENCOUNTER — TELEPHONE (OUTPATIENT)
Dept: INTERNAL MEDICINE CLINIC | Facility: CLINIC | Age: 73
End: 2018-04-10

## 2018-04-10 VITALS
SYSTOLIC BLOOD PRESSURE: 118 MMHG | HEART RATE: 72 BPM | OXYGEN SATURATION: 97 % | DIASTOLIC BLOOD PRESSURE: 70 MMHG | WEIGHT: 113.6 LBS | BODY MASS INDEX: 22.9 KG/M2 | HEIGHT: 59 IN | TEMPERATURE: 98.5 F

## 2018-04-10 DIAGNOSIS — H66.001 ACUTE SUPPURATIVE OTITIS MEDIA OF RIGHT EAR WITHOUT SPONTANEOUS RUPTURE OF TYMPANIC MEMBRANE, RECURRENCE NOT SPECIFIED: Primary | ICD-10-CM

## 2018-04-10 PROBLEM — R23.2 HOT FLASHES: Status: ACTIVE | Noted: 2017-10-10

## 2018-04-10 PROBLEM — S05.01XA RIGHT CORNEAL ABRASION: Status: ACTIVE | Noted: 2017-06-07

## 2018-04-10 PROBLEM — H04.129 DRY EYE: Status: ACTIVE | Noted: 2017-10-11

## 2018-04-10 PROCEDURE — 99213 OFFICE O/P EST LOW 20 MIN: CPT | Performed by: NURSE PRACTITIONER

## 2018-04-10 RX ORDER — AZITHROMYCIN 250 MG/1
TABLET, FILM COATED ORAL
Qty: 6 TABLET | Refills: 0 | Status: SHIPPED | OUTPATIENT
Start: 2018-04-10 | End: 2018-04-15

## 2018-04-10 NOTE — TELEPHONE ENCOUNTER
I called [facility] in [place] to verify how Rx was last written and who was the ordering  Rx was last written by a Dr Shabbir Swann for a 90 day supply and 1 refill  Pt still has a refill remaining which she can have filled on 4/15/2018  When I searched the doctor I saw he was a GYN with [address]  Pt has an appt with you today at 3:15PM for [facility]  This is an FYI, just in case she brings it up  She does not have a f/u scheduled with Dr Kacey Jones to discuss if he would be willing to fill the med  Thank you!

## 2018-04-10 NOTE — PATIENT INSTRUCTIONS
You have been prescribed an antibiotic today  Take the full course of prescription  Recommend taking with food as may upset your stomach  Also would recommend OTC probiotic or yogurt to help protect the natural brenna of your stomach  Rest   Stay well hydrated  Return for new/worsening symptoms

## 2018-04-10 NOTE — TELEPHONE ENCOUNTER
Pt l/m on [facility] Rx line asking if we could start fille her Gabapentin 100 mg, take 2 caps qhs  Med was previously filled by a physician she claims is no longer in practice

## 2018-04-10 NOTE — PROGRESS NOTES
Assessment/Plan:    No problem-specific Assessment & Plan notes found for this encounter  Diagnoses and all orders for this visit:    Acute suppurative otitis media of right ear without spontaneous rupture of tympanic membrane, recurrence not specified      You have been prescribed an antibiotic today  Take the full course of prescription  Recommend taking with food as may upset your stomach  Also would recommend OTC probiotic or yogurt to help protect the natural brenna of your stomach  Rest   Stay well hydrated  Return for new/worsening symptoms  Subjective:      Patient ID: Antoinette Ling is a 71 y o  female  Patient presents for an acute visit  She reports that she has a right-sided ear ache for the past 5 days  She had a cold last week and the ear pain followed  She also reports that she has right sided throat pain, as well  She feels that this is related to her ear pain  Earache    There is pain in the right ear  This is a new problem  The current episode started in the past 7 days  The problem occurs every few minutes  The problem has been waxing and waning  There has been no fever  The pain is moderate  Associated symptoms include coughing (patient reports that she has seasonal allergies), headaches (sinus headaches at times), rhinorrhea and a sore throat  Pertinent negatives include no abdominal pain, diarrhea, ear discharge, hearing loss, neck pain or vomiting  Treatments tried: allegra D  The treatment provided mild relief  There is no history of a chronic ear infection, hearing loss or a tympanostomy tube  Sore Throat    This is a new problem  The problem has been waxing and waning  The pain is worse on the right side  Associated symptoms include congestion, coughing (patient reports that she has seasonal allergies), ear pain, headaches (sinus headaches at times), a hoarse voice, a plugged ear sensation and swollen glands   Pertinent negatives include no abdominal pain, diarrhea, ear discharge, neck pain, shortness of breath, trouble swallowing or vomiting  The following portions of the patient's history were reviewed and updated as appropriate: allergies, current medications, past family history, past medical history, past social history, past surgical history and problem list     Review of Systems   Constitutional: Positive for fatigue  Negative for chills and fever  HENT: Positive for congestion, ear pain, hoarse voice, postnasal drip, rhinorrhea and sore throat  Negative for ear discharge, hearing loss, sinus pain, sinus pressure and trouble swallowing  Respiratory: Positive for cough (patient reports that she has seasonal allergies)  Negative for shortness of breath and wheezing  Cardiovascular: Negative for chest pain  Gastrointestinal: Negative for abdominal pain, diarrhea, nausea and vomiting  Genitourinary: Negative for dysuria  Musculoskeletal: Negative for neck pain  Skin: Negative for wound  Neurological: Positive for headaches (sinus headaches at times)  Psychiatric/Behavioral: The patient is nervous/anxious (at baseline)  History reviewed  No pertinent past medical history        Current Outpatient Prescriptions:     aspirin 81 MG tablet, Take 1 tablet by mouth daily, Disp: , Rfl:     Calcium 500 MG tablet, Take 2 tablets by mouth daily, Disp: , Rfl:     Carboxymethylcellulose Sod PF 1 % ophthalmic solution, 1 drop, Disp: , Rfl:     Cholecalciferol (VITAMIN D3) 2000 units TABS, Take 1 tablet by mouth daily, Disp: , Rfl:     conjugated estrogens (PREMARIN) vaginal cream, Apply a pea size amount of the estrogen cream to the opening of the vagina three nights per week , Disp: , Rfl:     cycloSPORINE (RESTASIS) 0 05 % ophthalmic emulsion, Apply 0 05 % to eye, Disp: , Rfl:     cycloSPORINE (RESTASIS) 0 05 % ophthalmic emulsion, Apply 1 drop to eye 2 (two) times a day, Disp: , Rfl:     diazepam (VALIUM) 10 mg tablet, 10 mg compounded suppositoriesPlace 1 vaginally daily prn, Disp: , Rfl:     diazepam (VALIUM) 5 mg tablet, 5 mg compounded suppositoriesPlace 1 vaginally 1-2 daily, Disp: , Rfl:     fluticasone (FLONASE) 50 mcg/act nasal spray, 2 sprays into each nostril daily, Disp: , Rfl:     gabapentin (NEURONTIN) 100 mg capsule, Take 2 capsules by mouth daily, Disp: , Rfl:     Glucosamine-Chondroitin 750-600 MG CHEW, Chew 1 tablet daily, Disp: , Rfl:     HM OMEGA-3-6-9 FATTY ACIDS CAPS, Take by mouth Daily, Disp: , Rfl:     meloxicam (MOBIC) 7 5 mg tablet, Take 1 tablet by mouth daily, Disp: , Rfl:     montelukast (SINGULAIR) 10 mg tablet, TK 1 T PO D, Disp: , Rfl:     Multiple Vitamins-Minerals (MULTI VITAMIN/MINERALS) TABS, Take 1 tablet by mouth daily, Disp: , Rfl:     Olopatadine HCl 0 6 % SOLN, into each nostril Daily, Disp: , Rfl:     phenazopyridine (PYRIDIUM) 95 MG tablet, Take 1 tablet by mouth daily, Disp: , Rfl:     Polyethyl Glycol-Propyl Glycol (SYSTANE) 0 4-0 3 % GEL, Apply to eye, Disp: , Rfl:     polyvinyl alcohol (LIQUIFILM TEARS) 1 4 % ophthalmic solution, Apply to eye, Disp: , Rfl:     Probiotic Product (PROBIOTIC-10) CAPS, Take 1 capsule by mouth daily, Disp: , Rfl:     promethazine-dextromethorphan (PHENERGAN-DM) 6 25-15 mg/5 mL oral syrup, Take by mouth 4 times daily, Disp: , Rfl:     pyridoxine (VITAMIN B6) 50 mg tablet, Take 50 mg by mouth 3 (three) times a week  , Disp: , Rfl:     Red Yeast Rice Extract (GNP RED YEAST RICE) 600 MG CAPS, Take 1 capsule by mouth daily, Disp: , Rfl:     verapamil (CALAN) 40 mg tablet, , Disp: , Rfl: 3    vitamin E, tocopherol, 400 units capsule, Take 1 capsule by mouth daily, Disp: , Rfl:     Cholecalciferol 1000 units capsule, Take 1,500 Units by mouth, Disp: , Rfl:     LORazepam (ATIVAN) 0 5 mg tablet, Take 1 tablet (0 5 mg total) by mouth once for 1 dose, Disp: 30 tablet, Rfl: 0    triamterene-hydrochlorothiazide (MAXZIDE) 75-50 MG per tablet, Take 1 tablet by mouth once for 1 dose, Disp: 90 tablet, Rfl: 0    Allergies   Allergen Reactions    Diltiazem Other (See Comments)     Reaction Date: [id number]; Category: Adverse Reaction;     Dog Epithelium Allergy Skin Test     Molds & Smuts     Other Other (See Comments)     Reaction Date: [id number]; Category: Adverse Reaction;     Penicillins Other (See Comments)     Reaction Date: [id number]; Category: Allergy;     Pollen Extract      Category: Allergy;     Shellfish-Derived Products     Short Ragweed Pollen Ext     Tetracycline     Tree Extract     Moxifloxacin Rash     Reaction Date: [id number]; Category: Adverse Reaction;   Category: Adverse Reaction;     Prednisone Rash     Reaction Date: [id number]; Category: Allergy;     Sulfa Antibiotics Rash and Other (See Comments)     Reaction Date: [id number]; Category: Allergy; Reaction Date: [id number]; Category: Allergy;     Trimethoprim Rash     Other reaction(s): Contact Dermatitis  Reaction Date: [id number]; Category: Adverse Reaction;        Social History   History reviewed  No pertinent surgical history  History reviewed  No pertinent family history  Objective:  /70 (BP Location: Left arm, Patient Position: Sitting, Cuff Size: Standard)   Pulse 72   Temp 98 5 °F (36 9 °C) (Tympanic)   Ht 4' 10 5" (1 486 m)   Wt 51 5 kg (113 lb 9 6 oz)   SpO2 97%   BMI 23 34 kg/m²        Physical Exam   Constitutional: She is oriented to person, place, and time  She appears well-developed and well-nourished  No distress  HENT:   Head: Normocephalic and atraumatic  Right Ear: External ear normal  Tympanic membrane is erythematous and bulging  Left Ear: External ear normal  Tympanic membrane is not erythematous and not bulging  Mouth/Throat: Oropharynx is clear and moist    Eyes: Conjunctivae are normal  Pupils are equal, round, and reactive to light  No scleral icterus  Neck: Normal range of motion  Neck supple  No thyromegaly present  Cardiovascular: Normal rate, regular rhythm and normal heart sounds  Pulmonary/Chest: Effort normal and breath sounds normal  No respiratory distress  Abdominal: Soft  Bowel sounds are normal  She exhibits no distension  Musculoskeletal: Normal range of motion  She exhibits no edema  Neurological: She is alert and oriented to person, place, and time  Skin: Skin is warm and dry  Psychiatric: She has a normal mood and affect  Her behavior is normal  Judgment and thought content normal    Vitals reviewed

## 2018-04-24 ENCOUNTER — OFFICE VISIT (OUTPATIENT)
Dept: INTERNAL MEDICINE CLINIC | Age: 73
End: 2018-04-24
Payer: COMMERCIAL

## 2018-04-24 VITALS
WEIGHT: 113.8 LBS | TEMPERATURE: 98 F | BODY MASS INDEX: 23.89 KG/M2 | OXYGEN SATURATION: 98 % | RESPIRATION RATE: 16 BRPM | HEART RATE: 73 BPM | SYSTOLIC BLOOD PRESSURE: 130 MMHG | DIASTOLIC BLOOD PRESSURE: 70 MMHG | HEIGHT: 58 IN

## 2018-04-24 DIAGNOSIS — E78.01 FAMILIAL HYPERCHOLESTEROLEMIA: ICD-10-CM

## 2018-04-24 DIAGNOSIS — J30.1 ALLERGY TO TREES: ICD-10-CM

## 2018-04-24 DIAGNOSIS — J32.1 CHRONIC FRONTAL SINUSITIS: Primary | ICD-10-CM

## 2018-04-24 DIAGNOSIS — F41.9 ANXIETY: ICD-10-CM

## 2018-04-24 DIAGNOSIS — H92.02 EARACHE ON LEFT: ICD-10-CM

## 2018-04-24 PROCEDURE — 99213 OFFICE O/P EST LOW 20 MIN: CPT | Performed by: NURSE PRACTITIONER

## 2018-04-24 RX ORDER — TRIAMTERENE AND HYDROCHLOROTHIAZIDE 75; 50 MG/1; MG/1
1 TABLET ORAL ONCE
Qty: 90 TABLET | Refills: 0 | Status: SHIPPED | OUTPATIENT
Start: 2018-04-24 | End: 2018-06-18 | Stop reason: SDUPTHER

## 2018-04-24 RX ORDER — CLARITHROMYCIN 500 MG/1
500 TABLET, COATED ORAL EVERY 12 HOURS SCHEDULED
Qty: 28 TABLET | Refills: 0 | Status: CANCELLED | OUTPATIENT
Start: 2018-04-24 | End: 2018-05-08

## 2018-04-24 RX ORDER — LORAZEPAM 0.5 MG/1
0.5 TABLET ORAL ONCE
Qty: 30 TABLET | Refills: 0 | Status: SHIPPED | OUTPATIENT
Start: 2018-04-24 | End: 2018-05-16 | Stop reason: SDUPTHER

## 2018-04-24 NOTE — PROGRESS NOTES
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  She is receiving allergy testing at this time and allergy shots  Patient has seen ENT in the past however she has not been back recently  Patient's main complaint was postnasal drip and mild ear pain  Advised patient to use Flonase and an over-the-counter antihistamine however patient states that antihistamines increase her blood pressure and make her eyes dry so she does not want to take it  Will advise patient to continue with her nasal sprays, also advised patient to either follow up with her allergist or her ENT about her postnasal drip and ear pain  Earache on left    Patient has been recently treated with azithromycin  She presents today with earache  Patient is here does not seem to be infected at this time, seems to be related to her allergies with some fluid behind the ears  Advised patient that antihistamine in conjunction with Flonase would help drain her ears  Also recommended Coricidin HBP as a decongestant to help dry up sinuses  Patient advised to use warm salt water rinses for sore throat  And stay hydrated to flush sinuses  Diagnoses and all orders for this visit:    Chronic frontal sinusitis    Allergy to trees    Familial hypercholesterolemia  -     triamterene-hydrochlorothiazide (MAXZIDE) 75-50 MG per tablet; Take 1 tablet by mouth once for 1 dose    Anxiety  -     LORazepam (ATIVAN) 0 5 mg tablet; Take 1 tablet (0 5 mg total) by mouth once for 1 dose    Earache on left    Other orders  -     Cancel: clarithromycin (BIAXIN) 500 mg tablet; Take 1 tablet (500 mg total) by mouth every 12 (twelve) hours for 14 days          Subjective:      Patient ID: Hudson Bustamante is a 71 y o  female  Patient presents today for follow-up on ear infection      Patient was seen on 04/10/2018 and was given a Z-Enoc for her ear infection of the right ear  Of note patient currently uses Flonase and Olopatadine at night  Pt  States that antihistamines do not work for her  Pt  Sees has an appointment to get allergy shots  She sees ENT about once a year  She stated her ear felt better and then it started to get worse again  Pt  Has history of chronic sinus issues  She took 1/2 of allergia D today  Pt  Works at [employer] and has allergies to dog and cats  Earache    There is pain in the right ear  This is a recurrent problem  The current episode started 1 to 4 weeks ago (two weeks)  The problem occurs constantly  There has been no fever  The pain is at a severity of 3/10  The pain is mild  Associated symptoms include coughing (dry cough), ear discharge, headaches (sinus headaches at times), rhinorrhea and a sore throat  Pertinent negatives include no diarrhea or vomiting  She has tried antibiotics for the symptoms  The treatment provided mild relief  The following portions of the patient's history were reviewed and updated as appropriate: allergies, current medications, past family history, past medical history, past social history, past surgical history and problem list     Review of Systems   Constitutional: Negative for appetite change, chills, fatigue and fever  HENT: Positive for ear discharge, ear pain (right ear), postnasal drip, rhinorrhea and sore throat  Negative for sinus pain and sinus pressure  Eyes: Negative for pain, discharge and itching  Respiratory: Positive for cough (dry cough)  Negative for chest tightness and shortness of breath  Cardiovascular: Negative for chest pain and palpitations  Gastrointestinal: Negative for blood in stool, diarrhea, nausea and vomiting  Genitourinary: Negative for dysuria, hematuria and urgency  Allergic/Immunologic: Positive for environmental allergies  Neurological: Positive for headaches (sinus headaches at times)           Past Medical History:   Diagnosis Date    Allergic     Dry eye syndrome, bilateral          Current Outpatient Prescriptions:     aspirin 81 MG tablet, Take 1 tablet by mouth daily, Disp: , Rfl:     Calcium 500 MG tablet, Take 2 tablets by mouth daily, Disp: , Rfl:     Carboxymethylcellulose Sod PF 1 % ophthalmic solution, 1 drop, Disp: , Rfl:     Cholecalciferol (VITAMIN D3) 2000 units TABS, Take 1 tablet by mouth daily, Disp: , Rfl:     conjugated estrogens (PREMARIN) vaginal cream, Apply a pea size amount of the estrogen cream to the opening of the vagina three nights per week , Disp: , Rfl:     cycloSPORINE (RESTASIS) 0 05 % ophthalmic emulsion, Apply 1 drop to eye 2 (two) times a day, Disp: , Rfl:     diazepam (VALIUM) 10 mg tablet, 10 mg compounded suppositoriesPlace 1 vaginally daily prn, Disp: , Rfl:     diazepam (VALIUM) 5 mg tablet, 5 mg compounded suppositoriesPlace 1 vaginally 1-2 daily, Disp: , Rfl:     fluticasone (FLONASE) 50 mcg/act nasal spray, 2 sprays into each nostril daily, Disp: , Rfl:     gabapentin (NEURONTIN) 100 mg capsule, Take 2 capsules by mouth daily, Disp: , Rfl:     Glucosamine-Chondroitin 750-600 MG CHEW, Chew 1 tablet daily, Disp: , Rfl:     HM OMEGA-3-6-9 FATTY ACIDS CAPS, Take by mouth Daily, Disp: , Rfl:     meloxicam (MOBIC) 7 5 mg tablet, Take 1 tablet by mouth daily, Disp: , Rfl:     Multiple Vitamins-Minerals (MULTI VITAMIN/MINERALS) TABS, Take 1 tablet by mouth daily, Disp: , Rfl:     Olopatadine HCl 0 6 % SOLN, into each nostril Daily, Disp: , Rfl:     phenazopyridine (PYRIDIUM) 95 MG tablet, Take 1 tablet by mouth daily, Disp: , Rfl:     Polyethyl Glycol-Propyl Glycol (SYSTANE) 0 4-0 3 % GEL, Apply to eye, Disp: , Rfl:     polyvinyl alcohol (LIQUIFILM TEARS) 1 4 % ophthalmic solution, Apply to eye, Disp: , Rfl:     Probiotic Product (PROBIOTIC-10) CAPS, Take 1 capsule by mouth daily, Disp: , Rfl:     promethazine-dextromethorphan (PHENERGAN-DM) 6 25-15 mg/5 mL oral syrup, Take by mouth 4 times daily, Disp: , Rfl:     pyridoxine (VITAMIN B6) 50 mg tablet, Take 50 mg by mouth 3 (three) times a week  , Disp: , Rfl:     Red Yeast Rice Extract (GNP RED YEAST RICE) 600 MG CAPS, Take 1 capsule by mouth daily, Disp: , Rfl:     verapamil (CALAN) 40 mg tablet, , Disp: , Rfl: 3    vitamin E, tocopherol, 400 units capsule, Take 1 capsule by mouth daily, Disp: , Rfl:     Cholecalciferol 1000 units capsule, Take 1,500 Units by mouth, Disp: , Rfl:     cycloSPORINE (RESTASIS) 0 05 % ophthalmic emulsion, Apply 0 05 % to eye, Disp: , Rfl:     LORazepam (ATIVAN) 0 5 mg tablet, Take 1 tablet (0 5 mg total) by mouth once for 1 dose, Disp: 30 tablet, Rfl: 0    montelukast (SINGULAIR) 10 mg tablet, TK 1 T PO D, Disp: , Rfl:     triamterene-hydrochlorothiazide (MAXZIDE) 75-50 MG per tablet, Take 1 tablet by mouth once for 1 dose, Disp: 90 tablet, Rfl: 0    Allergies   Allergen Reactions    Cat Hair Extract Allergic Rhinitis     Allergy to cat hair dander    Diltiazem Other (See Comments)     Reaction Date: [id number]; Category: Adverse Reaction;     Dog Epithelium Allergy Skin Test     Molds & Smuts     Other Other (See Comments)     Reaction Date: [id number]; Category: Adverse Reaction;     Penicillins Other (See Comments)     Reaction Date: [id number]; Category: Allergy;     Pollen Extract      Category: Allergy;     Shellfish-Derived Products     Short Ragweed Pollen Ext     Tetracycline     Tree Extract     Moxifloxacin Rash     Reaction Date: [id number]; Category: Adverse Reaction;   Category: Adverse Reaction;     Prednisone Rash     Reaction Date: [id number]; Category: Allergy;     Sulfa Antibiotics Rash and Other (See Comments)     Reaction Date: [id number]; Category: Allergy; Reaction Date: [id number]; Category: Allergy;     Trimethoprim Rash     Other reaction(s): Contact Dermatitis  Reaction Date: [id number]; Category:  Adverse Reaction;        Social History   Past Surgical History:   Procedure Laterality Date    HEMORRHOID SURGERY  1993    HYSTERECTOMY  27    MASTECTOMY, RADICAL Right      No family history on file  Objective:  /70 (BP Location: Left arm, Patient Position: Sitting, Cuff Size: Standard)   Pulse 73   Temp 98 °F (36 7 °C) (Tympanic)   Resp 16   Ht 4' 10 43" (1 484 m)   Wt 51 6 kg (113 lb 12 8 oz)   SpO2 98%   BMI 23 44 kg/m²     No results found for this or any previous visit (from the past 1344 hour(s))  Physical Exam   Constitutional: She is oriented to person, place, and time  She appears well-developed and well-nourished  No distress  HENT:   Head: Normocephalic and atraumatic  Right Ear: External ear normal  Tympanic membrane is not bulging  No middle ear effusion  Left Ear: External ear normal  Tympanic membrane is not bulging  No middle ear effusion  Nose: Mucosal edema and rhinorrhea present  Right sinus exhibits no maxillary sinus tenderness and no frontal sinus tenderness  Left sinus exhibits no maxillary sinus tenderness and no frontal sinus tenderness  Mouth/Throat: Mucous membranes are pale and dry  Posterior oropharyngeal erythema present  No oropharyngeal exudate  Fluid noted behind patients ear b/l ears, does not appear to be infected  Eyes: Conjunctivae and EOM are normal  Pupils are equal, round, and reactive to light  Right eye exhibits no discharge  Left eye exhibits no discharge  Neck: Normal range of motion  Neck supple  No thyromegaly present  Cardiovascular: Normal rate, regular rhythm, normal heart sounds and intact distal pulses  Exam reveals no gallop and no friction rub  No murmur heard  Pulmonary/Chest: Effort normal and breath sounds normal  No stridor  No respiratory distress  She has no wheezes  She has no rales  Abdominal: Soft  Bowel sounds are normal  She exhibits no distension  There is no tenderness  Lymphadenopathy:     She has no cervical adenopathy     Neurological: She is alert and oriented to person, place, and time  Skin: Skin is warm and dry  No rash noted  She is not diaphoretic  No erythema  Psychiatric: She has a normal mood and affect   Her behavior is normal  Judgment and thought content normal

## 2018-04-24 NOTE — PATIENT INSTRUCTIONS
Coricidin HBP, for post nasal drip  Stay hydrated and drink plenty of water  Use warm salt water rinses for sore throat    Follow-up with ENT and go see an allergist

## 2018-04-25 PROBLEM — H92.02 EARACHE ON LEFT: Status: ACTIVE | Noted: 2018-04-25

## 2018-04-25 NOTE — ASSESSMENT & PLAN NOTE
Patient has been recently treated with azithromycin  She presents today with earache  Patient is here does not seem to be infected at this time, seems to be related to her allergies with some fluid behind the ears  Advised patient that antihistamine in conjunction with Flonase would help drain her ears  Also recommended Coricidin HBP as a decongestant to help dry up sinuses  Patient advised to use warm salt water rinses for sore throat  And stay hydrated to flush sinuses

## 2018-04-25 NOTE — ASSESSMENT & PLAN NOTE
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

## 2018-05-16 DIAGNOSIS — F41.9 ANXIETY: ICD-10-CM

## 2018-05-16 RX ORDER — LORAZEPAM 0.5 MG/1
TABLET ORAL
Qty: 30 TABLET | Refills: 0
Start: 2018-05-16 | End: 2018-06-18 | Stop reason: SDUPTHER

## 2018-06-18 DIAGNOSIS — F41.9 ANXIETY: ICD-10-CM

## 2018-06-18 DIAGNOSIS — E78.01 FAMILIAL HYPERCHOLESTEROLEMIA: ICD-10-CM

## 2018-06-18 RX ORDER — LORAZEPAM 0.5 MG/1
TABLET ORAL
Qty: 30 TABLET | Refills: 0 | Status: SHIPPED | OUTPATIENT
Start: 2018-06-18 | End: 2018-08-01 | Stop reason: SDUPTHER

## 2018-06-18 RX ORDER — TRIAMTERENE AND HYDROCHLOROTHIAZIDE 75; 50 MG/1; MG/1
1 TABLET ORAL ONCE
Qty: 90 TABLET | Refills: 0 | Status: SHIPPED | OUTPATIENT
Start: 2018-06-18 | End: 2018-10-08 | Stop reason: SDUPTHER

## 2018-06-20 ENCOUNTER — DOCUMENTATION (OUTPATIENT)
Dept: INTERNAL MEDICINE CLINIC | Facility: CLINIC | Age: 73
End: 2018-06-20

## 2018-06-20 DIAGNOSIS — G89.29 CHRONIC BACK PAIN, UNSPECIFIED BACK LOCATION, UNSPECIFIED BACK PAIN LATERALITY: Primary | ICD-10-CM

## 2018-06-20 DIAGNOSIS — M54.9 CHRONIC BACK PAIN, UNSPECIFIED BACK LOCATION, UNSPECIFIED BACK PAIN LATERALITY: Primary | ICD-10-CM

## 2018-06-20 RX ORDER — MELOXICAM 7.5 MG/1
7.5 TABLET ORAL DAILY
Qty: 30 TABLET | Refills: 1 | Status: SHIPPED | OUTPATIENT
Start: 2018-06-20 | End: 2019-01-11 | Stop reason: SDUPTHER

## 2018-08-01 ENCOUNTER — OFFICE VISIT (OUTPATIENT)
Dept: INTERNAL MEDICINE CLINIC | Facility: CLINIC | Age: 73
End: 2018-08-01
Payer: COMMERCIAL

## 2018-08-01 VITALS
BODY MASS INDEX: 22.34 KG/M2 | TEMPERATURE: 98.4 F | SYSTOLIC BLOOD PRESSURE: 122 MMHG | HEIGHT: 59 IN | DIASTOLIC BLOOD PRESSURE: 80 MMHG | OXYGEN SATURATION: 98 % | HEART RATE: 73 BPM | WEIGHT: 110.8 LBS

## 2018-08-01 DIAGNOSIS — F41.9 ANXIETY: ICD-10-CM

## 2018-08-01 DIAGNOSIS — J32.9 CHRONIC SINUSITIS, UNSPECIFIED LOCATION: ICD-10-CM

## 2018-08-01 DIAGNOSIS — Z12.39 SCREENING FOR BREAST CANCER: Primary | ICD-10-CM

## 2018-08-01 DIAGNOSIS — Z12.11 SCREENING FOR COLON CANCER: ICD-10-CM

## 2018-08-01 DIAGNOSIS — M85.89 OSTEOPENIA OF MULTIPLE SITES: ICD-10-CM

## 2018-08-01 DIAGNOSIS — M79.2 NEURALGIA: ICD-10-CM

## 2018-08-01 DIAGNOSIS — M81.0 AGE RELATED OSTEOPOROSIS, UNSPECIFIED PATHOLOGICAL FRACTURE PRESENCE: ICD-10-CM

## 2018-08-01 PROBLEM — S05.01XA RIGHT CORNEAL ABRASION: Status: RESOLVED | Noted: 2017-06-07 | Resolved: 2018-08-01

## 2018-08-01 PROBLEM — H92.02 EARACHE ON LEFT: Status: RESOLVED | Noted: 2018-04-25 | Resolved: 2018-08-01

## 2018-08-01 PROCEDURE — 99214 OFFICE O/P EST MOD 30 MIN: CPT | Performed by: INTERNAL MEDICINE

## 2018-08-01 RX ORDER — DIAZEPAM 5 MG/1
5 TABLET ORAL
COMMUNITY
End: 2019-06-26 | Stop reason: CLARIF

## 2018-08-01 RX ORDER — OLOPATADINE HYDROCHLORIDE 665 UG/1
1 SPRAY NASAL
Qty: 1 BOTTLE | Refills: 5 | Status: SHIPPED | OUTPATIENT
Start: 2018-08-01 | End: 2020-12-09

## 2018-08-01 RX ORDER — LORAZEPAM 0.5 MG/1
TABLET ORAL
Qty: 30 TABLET | Refills: 2 | Status: SHIPPED | OUTPATIENT
Start: 2018-08-01 | End: 2018-10-08 | Stop reason: SDUPTHER

## 2018-08-01 NOTE — PROGRESS NOTES
Assessment/Plan:    Chronic sinusitis  Stable; continue with desensitization injections, supportive rx as need  Neuralgia  Stable; lorazepam and gabapentin to continue;    Osteopenia  DEXA scan requested    Anxiety  Lorazepam as need with refills  Diagnoses and all orders for this visit:    Screening for breast cancer    Age related osteoporosis, unspecified pathological fracture presence  -     DXA bone density spine hip and pelvis; Future    Screening for colon cancer  -     Ambulatory referral to Gastroenterology; Future  -     Ambulatory referral to Gastroenterology; Future    Anxiety  -     LORazepam (ATIVAN) 0 5 mg tablet; Take one tablet once daily as needed  Chronic sinusitis, unspecified location  -     Olopatadine HCl 0 6 % SOLN; 1 actuation into each nostril daily at bedtime for 180 days    Neuralgia    Osteopenia of multiple sites    Other orders  -     Cancel: Mammo screening bilateral w 3d & cad; Future  -     diazepam (VALIUM) 5 mg tablet; 5 mg Suppository as needed          Subjective:      Patient ID: Liam Nails is a 71 y o  female  Presented to the office anticipating a Medicare wellness examination however she was too early for her wellness exam so a review examination was completed instead  The patient has a remote history of breast cancer, S/P radical mastectomy and reconstruction and has been cancer free  She has a history of osteopenia and is soon due for a F/U DEXA scan  She has some chronic neuralgia symptoms involving her lower extremities which has been stable  She also has had intermittent issues with sinus congestion, headaches but these are also stable at the present time  She has not had any recent labs drawn          Family History   Problem Relation Age of Onset    Heart disease Mother         ischemic     Heart disease Father         ischemic     Kidney disease Father     Uterine cancer Sister     Liver cancer Maternal Grandmother     Colon cancer Paternal Aunt      Social History     Social History    Marital status: /Civil Union     Spouse name: N/A    Number of children: N/A    Years of education: N/A     Occupational History    Not on file       Social History Main Topics    Smoking status: Never Smoker    Smokeless tobacco: Never Used    Alcohol use No    Drug use: No    Sexual activity: Not on file     Other Topics Concern    Not on file     Social History Narrative    No narrative on file     Past Medical History:   Diagnosis Date    Allergic     Chronically dry eyes, bilateral     Colonic polyp     Disorder of bone and cartilage     Dry eye syndrome, bilateral     Eczema     unspecified type     Environmental allergies     Esophageal reflux     Hypertonicity of bladder     Malignant neoplasm of breast (HCC)     Uterine leiomyoma        Current Outpatient Prescriptions:     aspirin 81 MG tablet, Take 1 tablet by mouth daily, Disp: , Rfl:     Calcium 500 MG tablet, Take 500 mg by mouth 2 (two) times a day  , Disp: , Rfl:     Carboxymethylcellulose Sod PF 1 % ophthalmic solution, Apply 1 drop to eye daily at bedtime  , Disp: , Rfl:     Cholecalciferol (VITAMIN D3) 2000 units TABS, Take 1 tablet by mouth daily, Disp: , Rfl:     conjugated estrogens (PREMARIN) vaginal cream, Apply a pea size amount of the estrogen cream to the opening of the vagina three nights per week , Disp: , Rfl:     cycloSPORINE (RESTASIS) 0 05 % ophthalmic emulsion, Apply 0 05 % to eye every 12 (twelve) hours  , Disp: , Rfl:     diazepam (VALIUM) 10 mg tablet, 10 mg compounded suppositoriesPlace 1 vaginally daily prn, Disp: , Rfl:     diazepam (VALIUM) 5 mg tablet, 5 mg Suppository as needed, Disp: , Rfl:     fluticasone (FLONASE) 50 mcg/act nasal spray, 2 sprays into each nostril daily, Disp: , Rfl:     gabapentin (NEURONTIN) 100 mg capsule, Take 2 capsules by mouth daily, Disp: , Rfl:     Glucosamine-Chondroitin 750-600 MG CHEW, Chew 1 tablet daily, Disp: , Rfl:     HM OMEGA-3-6-9 FATTY ACIDS CAPS, Take 2 capsules by mouth Daily  , Disp: , Rfl:     LORazepam (ATIVAN) 0 5 mg tablet, Take one tablet once daily as needed  , Disp: 30 tablet, Rfl: 2    meloxicam (MOBIC) 7 5 mg tablet, Take 1 tablet (7 5 mg total) by mouth daily (Patient taking differently: Take 7 5 mg by mouth daily as needed  ), Disp: 30 tablet, Rfl: 1    Multiple Vitamins-Minerals (MULTI VITAMIN/MINERALS) TABS, Take 1 tablet by mouth daily, Disp: , Rfl:     phenazopyridine (PYRIDIUM) 95 MG tablet, Take 1 tablet by mouth daily, Disp: , Rfl:     Polyethyl Glycol-Propyl Glycol (SYSTANE) 0 4-0 3 % GEL, Apply to eye 4-5 times daily , Disp: , Rfl:     Probiotic Product (PROBIOTIC-10) CAPS, Take 1 capsule by mouth daily, Disp: , Rfl:     promethazine-dextromethorphan (PHENERGAN-DM) 6 25-15 mg/5 mL oral syrup, Take by mouth 4 (four) times a day as needed  , Disp: , Rfl:     Red Yeast Rice Extract (GNP RED YEAST RICE) 600 MG CAPS, Take 1 capsule by mouth daily, Disp: , Rfl:     triamterene-hydrochlorothiazide (MAXZIDE) 75-50 MG per tablet, Take 1 tablet by mouth once for 1 dose (Patient taking differently: Take 1 tablet by mouth daily  ), Disp: 90 tablet, Rfl: 0    verapamil (CALAN) 40 mg tablet, Take 20 mg by mouth daily  , Disp: , Rfl: 3    vitamin E, tocopherol, 400 units capsule, Take 1 capsule by mouth daily, Disp: , Rfl:     Olopatadine HCl 0 6 % SOLN, 1 actuation into each nostril daily at bedtime for 180 days, Disp: 1 Bottle, Rfl: 5  Allergies   Allergen Reactions    Cat Hair Extract Allergic Rhinitis     Allergy to cat hair dander    Diltiazem Other (See Comments)     Reaction Date: [id number]; Category: Adverse Reaction;     Dog Epithelium Allergy Skin Test     Molds & Smuts     Other Other (See Comments)     Reaction Date: [id number]; Category: Adverse Reaction;     Penicillins Other (See Comments)     Reaction Date: [id number]; Category:  Allergy;     Pollen Extract Category: Allergy;     Shellfish-Derived Products     Short Ragweed Pollen Ext     Tetracycline     Tree Extract     Moxifloxacin Rash     Reaction Date: [id number]; Category: Adverse Reaction;   Category: Adverse Reaction;     Prednisone Rash     Reaction Date: [id number]; Category: Allergy;     Sulfa Antibiotics Rash and Other (See Comments)     Reaction Date: [id number]; Category: Allergy; Reaction Date: [id number]; Category: Allergy;     Trimethoprim Rash     Other reaction(s): Contact Dermatitis  Reaction Date: [id number]; Category: Adverse Reaction;      Past Surgical History:   Procedure Laterality Date    BREAST RECONSTRUCTION Right     with implant prosthesis right breast / 2004   [address] Right     1998    MASTECTOMY, RADICAL Right          Review of Systems   Constitutional: Negative  HENT: Positive for congestion (on occasion) and sinus pressure (frequently)  Negative for nosebleeds, postnasal drip and rhinorrhea  Respiratory: Negative  Cardiovascular: Negative  Gastrointestinal: Negative  Genitourinary:        History of interstitial cystitis   Musculoskeletal: Negative  Skin: Negative  Neurological: Positive for numbness (and pain in lower extremities)  Hematological: Negative  Psychiatric/Behavioral: The patient is nervous/anxious (described as mild)  Objective:      /80 (BP Location: Left arm, Patient Position: Sitting, Cuff Size: Adult)   Pulse 73   Temp 98 4 °F (36 9 °C) (Oral)   Ht 4' 10 5" (1 486 m)   Wt 50 3 kg (110 lb 12 8 oz)   SpO2 98%   BMI 22 76 kg/m²          Physical Exam   Constitutional: She is oriented to person, place, and time  She appears well-developed and well-nourished  No distress  HENT:   Head: Normocephalic  Mouth/Throat: Oropharynx is clear and moist    Eyes: Conjunctivae and EOM are normal  Pupils are equal, round, and reactive to light  Neck: Neck supple   No JVD present  No tracheal deviation present  No thyromegaly present  Cardiovascular: Normal rate, normal heart sounds and intact distal pulses  No murmur heard  Pulmonary/Chest: Effort normal  No respiratory distress  Abdominal: Soft  She exhibits no distension  Musculoskeletal: She exhibits no edema or deformity  Lymphadenopathy:     She has no cervical adenopathy  Neurological: She is alert and oriented to person, place, and time  Skin: Skin is warm and dry  Psychiatric: She has a normal mood and affect  Thought content normal    Vitals reviewed

## 2018-09-17 DIAGNOSIS — E78.01 FAMILIAL HYPERCHOLESTEROLEMIA: ICD-10-CM

## 2018-09-17 RX ORDER — TRIAMTERENE AND HYDROCHLOROTHIAZIDE 75; 50 MG/1; MG/1
TABLET ORAL
Qty: 90 TABLET | Refills: 0 | OUTPATIENT
Start: 2018-09-17

## 2018-10-08 DIAGNOSIS — F41.9 ANXIETY: ICD-10-CM

## 2018-10-08 DIAGNOSIS — E78.01 FAMILIAL HYPERCHOLESTEROLEMIA: ICD-10-CM

## 2018-10-08 RX ORDER — LORAZEPAM 0.5 MG/1
0.5 TABLET ORAL DAILY PRN
Qty: 90 TABLET | Refills: 0 | Status: SHIPPED | OUTPATIENT
Start: 2018-10-08 | End: 2019-03-14 | Stop reason: SDUPTHER

## 2018-10-08 RX ORDER — TRIAMTERENE AND HYDROCHLOROTHIAZIDE 75; 50 MG/1; MG/1
1 TABLET ORAL DAILY
Qty: 90 TABLET | Refills: 1 | Status: SHIPPED | OUTPATIENT
Start: 2018-10-08 | End: 2019-05-01 | Stop reason: SDUPTHER

## 2018-10-08 NOTE — TELEPHONE ENCOUNTER
Ok per PDMP site    Last appt, 8/1/18    Next appt, 11/5/18    PT requested Lorazepam Rx be changed to 90 days  Canceled 30 day Rx on file at [facility]  Please authorize new 90 day Rx  Thank you!

## 2018-10-22 LAB
BUN SERPL-MCNC: 20 MG/DL (ref 7–25)
BUN/CREAT SERPL: ABNORMAL (CALC) (ref 6–22)
CALCIUM SERPL-MCNC: 9.9 MG/DL (ref 8.6–10.4)
CHLORIDE SERPL-SCNC: 99 MMOL/L (ref 98–110)
CHOLEST SERPL-MCNC: 239 MG/DL
CHOLEST/HDLC SERPL: 2 (CALC)
CO2 SERPL-SCNC: 33 MMOL/L (ref 20–32)
CREAT SERPL-MCNC: 0.91 MG/DL (ref 0.6–0.93)
GLUCOSE SERPL-MCNC: 75 MG/DL (ref 65–99)
HDLC SERPL-MCNC: 118 MG/DL
LDLC SERPL CALC-MCNC: 102 MG/DL (CALC)
NONHDLC SERPL-MCNC: 121 MG/DL (CALC)
POTASSIUM SERPL-SCNC: 3.7 MMOL/L (ref 3.5–5.3)
SL AMB EGFR AFRICAN AMERICAN: 73 ML/MIN/1.73M2
SL AMB EGFR NON AFRICAN AMERICAN: 63 ML/MIN/1.73M2
SODIUM SERPL-SCNC: 140 MMOL/L (ref 135–146)
TRIGL SERPL-MCNC: 101 MG/DL

## 2018-11-05 ENCOUNTER — OFFICE VISIT (OUTPATIENT)
Dept: INTERNAL MEDICINE CLINIC | Facility: CLINIC | Age: 73
End: 2018-11-05
Payer: COMMERCIAL

## 2018-11-05 VITALS
WEIGHT: 113.4 LBS | HEIGHT: 59 IN | SYSTOLIC BLOOD PRESSURE: 120 MMHG | OXYGEN SATURATION: 95 % | TEMPERATURE: 99 F | DIASTOLIC BLOOD PRESSURE: 70 MMHG | BODY MASS INDEX: 22.86 KG/M2 | HEART RATE: 79 BPM

## 2018-11-05 DIAGNOSIS — Z00.00 MEDICARE ANNUAL WELLNESS VISIT, SUBSEQUENT: ICD-10-CM

## 2018-11-05 DIAGNOSIS — H04.123 DRY EYE SYNDROME, BILATERAL: Primary | ICD-10-CM

## 2018-11-05 PROCEDURE — G0439 PPPS, SUBSEQ VISIT: HCPCS | Performed by: INTERNAL MEDICINE

## 2018-11-05 PROCEDURE — 1160F RVW MEDS BY RX/DR IN RCRD: CPT | Performed by: INTERNAL MEDICINE

## 2018-11-05 PROCEDURE — 1125F AMNT PAIN NOTED PAIN PRSNT: CPT | Performed by: INTERNAL MEDICINE

## 2018-11-05 PROCEDURE — 1036F TOBACCO NON-USER: CPT | Performed by: INTERNAL MEDICINE

## 2018-11-05 PROCEDURE — 3008F BODY MASS INDEX DOCD: CPT | Performed by: INTERNAL MEDICINE

## 2018-11-05 PROCEDURE — 1170F FXNL STATUS ASSESSED: CPT | Performed by: INTERNAL MEDICINE

## 2018-11-05 RX ORDER — CYCLOSPORINE 0.5 MG/ML
1 EMULSION OPHTHALMIC EVERY 12 HOURS
Qty: 5.5 ML | Refills: 6 | Status: SHIPPED | OUTPATIENT
Start: 2018-11-05 | End: 2019-03-15 | Stop reason: SDUPTHER

## 2018-11-05 NOTE — PROGRESS NOTES
Assessment and Plan:    Problem List Items Addressed This Visit     None        Health Maintenance Due   Topic Date Due    Depression Screening PHQ  1945    Medicare Annual Wellness Visit (AWV)  1945    DTaP,Tdap,and Td Vaccines (1 - Tdap) 10/22/2008    Fall Risk  09/04/2011    Urinary Incontinence Screening  09/04/2011    Pneumococcal PPSV23/PCV13 65+ Years / High and Highest Risk (2 of 2 - PPSV23) 08/17/2015    INFLUENZA VACCINE  07/01/2018         HPI:  Eleanor De La Paz is a 68 y o  female here for her Subsequent Wellness Visit      Patient Active Problem List   Diagnosis    Essential hypertension    Chronic interstitial cystitis    Allergy to animal dander    Allergy to trees    Anxiety    Atrophic vaginitis    Bilateral low back pain without sciatica    Chronic sinusitis    Deviated nasal septum    Dry eye    Family history of ischemic heart disease (IHD)    Hot flashes    Hypertonicity of bladder    Neuralgia    Osteoarthritis    Osteopenia    Personal history of breast cancer    PFD (pelvic floor dysfunction)    Spondylosis of lumbosacral joint without myelopathy    Thoracic or lumbosacral neuritis or radiculitis     Past Medical History:   Diagnosis Date    Allergic     Chronically dry eyes, bilateral     Colonic polyp     Disorder of bone and cartilage     Dry eye syndrome, bilateral     Eczema     unspecified type     Environmental allergies     Esophageal reflux     Hypertonicity of bladder     Malignant neoplasm of breast ([address] )     Uterine leiomyoma      Past Surgical History:   Procedure Laterality Date    BREAST RECONSTRUCTION Right     with implant prosthesis right breast / 2004   [address] Right     1998    MASTECTOMY, RADICAL Right      Family History   Problem Relation Age of Onset    Heart disease Mother         ischemic     Heart disease Father         ischemic     Kidney disease Father  Uterine cancer Sister     Liver cancer Maternal Grandmother     Colon cancer Paternal Aunt      History   Smoking Status    Never Smoker   Smokeless Tobacco    Never Used     History   Alcohol Use No      History   Drug Use No       Current Outpatient Prescriptions   Medication Sig Dispense Refill    aspirin 81 MG tablet Take 1 tablet by mouth daily      Calcium 500 MG tablet Take 500 mg by mouth 2 (two) times a day        Carboxymethylcellulose Sod PF 1 % ophthalmic solution Apply 1 drop to eye daily at bedtime        Cholecalciferol (VITAMIN D3) 2000 units TABS Take 1 tablet by mouth daily      conjugated estrogens (PREMARIN) vaginal cream Apply a pea size amount of the estrogen cream to the opening of the vagina three nights per week        cycloSPORINE (RESTASIS) 0 05 % ophthalmic emulsion Apply 0 05 % to eye every 12 (twelve) hours        diazepam (VALIUM) 10 mg tablet 10 mg compounded suppositoriesPlace 1 vaginally daily prn      diazepam (VALIUM) 5 mg tablet 5 mg Suppository as needed      fluticasone (FLONASE) 50 mcg/act nasal spray 2 sprays into each nostril daily      gabapentin (NEURONTIN) 100 mg capsule Take 2 capsules by mouth daily      Glucosamine-Chondroitin 750-600 MG CHEW Chew 1 tablet daily      HM OMEGA-3-6-9 FATTY ACIDS CAPS Take 2 capsules by mouth Daily        LORazepam (ATIVAN) 0 5 mg tablet Take 1 tablet (0 5 mg total) by mouth daily as needed for anxiety 90 tablet 0    meloxicam (MOBIC) 7 5 mg tablet Take 1 tablet (7 5 mg total) by mouth daily (Patient taking differently: Take 7 5 mg by mouth daily as needed  ) 30 tablet 1    Multiple Vitamins-Minerals (MULTI VITAMIN/MINERALS) TABS Take 1 tablet by mouth daily      Olopatadine HCl 0 6 % SOLN 1 actuation into each nostril daily at bedtime for 180 days 1 Bottle 5    phenazopyridine (PYRIDIUM) 95 MG tablet Take 1 tablet by mouth daily      Polyethyl Glycol-Propyl Glycol (SYSTANE) 0 4-0 3 % GEL Apply to eye 4-5 times daily       Probiotic Product (PROBIOTIC-10) CAPS Take 1 capsule by mouth daily      promethazine-dextromethorphan (PHENERGAN-DM) 6 25-15 mg/5 mL oral syrup Take by mouth 4 (four) times a day as needed        Red Yeast Rice Extract (GNP RED YEAST RICE) 600 MG CAPS Take 1 capsule by mouth daily      triamterene-hydrochlorothiazide (MAXZIDE) 75-50 MG per tablet Take 1 tablet by mouth daily 90 tablet 1    verapamil (CALAN) 40 mg tablet Take 20 mg by mouth daily    3    vitamin E, tocopherol, 400 units capsule Take 1 capsule by mouth daily       No current facility-administered medications for this visit  Allergies   Allergen Reactions    Cat Hair Extract Allergic Rhinitis     Allergy to cat hair dander    Diltiazem Other (See Comments)     Reaction Date: [id number]; Category: Adverse Reaction;     Dog Epithelium Allergy Skin Test     Molds & Smuts     Other Other (See Comments)     Reaction Date: [id number]; Category: Adverse Reaction;     Penicillins Other (See Comments)     Reaction Date: 40NZQ0946; Category: Allergy;     Pollen Extract      Category: Allergy;     Shellfish-Derived Products     Short Ragweed Pollen Ext     Tetracycline     Tree Extract     Moxifloxacin Rash     Reaction Date: [id number]; Category: Adverse Reaction;   Category: Adverse Reaction;     Prednisone Rash     Reaction Date: [id number]; Category: Allergy;     Sulfa Antibiotics Rash and Other (See Comments)     Reaction Date: [id number]; Category: Allergy; Reaction Date: [id number]; Category: Allergy;     Trimethoprim Rash     Other reaction(s): Contact Dermatitis  Reaction Date: [id number]; Category:  Adverse Reaction;      Immunization History   Administered Date(s) Administered    Influenza Quadrivalent Preservative Free 3 years and older IM 10/11/2017    Influenza TIV (IM) 09/29/2015    Pneumococcal Conjugate 13-Valent 06/22/2015    Pneumococcal Polysaccharide PPV23 01/01/2007    TD (adult) Preservative Free 10/21/2008    Zoster 06/10/2009       Patient Care Team:  Bob Kerns MD as PCP - General (Internal Medicine)    Medicare Screening Tests and Risk Assessments:  Last Medicare Wellness visit information reviewed, patient interviewed and updates made to the record today  Health Risk Assessment:  Patient rates overall health as very good  Patient feels that their physical health rating is Same  Eyesight was rated as Slightly worse  Hearing was rated as Slightly worse  Patient feels that their emotional and mental health rating is Slightly worse  Pain experienced by patient in the last 7 days has been A lot  Patient's pain rating has been 5/10  Emotional/Mental Health:  Patient has been feeling nervous/anxious  PHQ-9 Depression Screening:    Frequency of the following problems over the past two weeks:      1  Little interest or pleasure in doing things: 0 - not at all      2  Feeling down, depressed, or hopeless: 0 - not at all  PHQ-2 Score: 0          Broken Bones/Falls: Fall Risk Assessment:    In the past year, patient has experienced: No history of falling in past year          Bladder/Bowel:  Patient has leaked urine accidently in the last six months  Patient reports no loss of bowel control  Immunizations:  Patient has had a flu vaccination within the last year  Patient has received a pneumonia shot  Patient has received a shingles shot  Patient has received tetanus/diphtheria shot  Home Safety:  Patient does not have trouble with stairs inside or outside of their home  Patient currently reports that there are no safety hazards present in home, working smoke alarms, no working carbon monoxide detectors        Preventative Screenings:   Breast cancer screening performed, colon cancer screen completed, cholesterol screen completed, glaucoma eye exam completed,     Nutrition:  Current diet: Regular, No Added Salt and Limited junk food with servings of the following:    Medications:  Patient is currently taking over-the-counter supplements  Patient is able to manage medications  Lifestyle Choices:  Patient reports no tobacco use  Patient has not smoked or used tobacco in the past   Patient reports no alcohol use  Patient drives a vehicle  Patient wears seat belt  Activities of Daily Living:  Can get out of bed by his or her self, able to dress self, able to make own meals, able to do own shopping, able to bathe self, can do own laundry/housekeeping, can manage own money, pay bills and track expenses    Previous Hospitalizations:  Hospitalization or ED visit in past 12 months        Advanced Directives:  Patient has not decided on power of   Patient has not completed advanced directive  Preventative Screening/Counseling:      Cardiovascular:      General: Screening Current      Counseling: Healthy Diet          Diabetes:      General: Screening Current      Counseling: Healthy Diet and Improve Physical Activity          Colorectal Cancer:      General: Screening Current          Breast Cancer:      General: Screening Current          Cervical Cancer:      General: Screening Not Indicated          Osteoporosis:      General: Risks and Benefits Discussed      Counseling: Calcium and Vitamin D Intake and Regular Weightbearing Exercise          AAA:      General: Screening Not Indicated          Glaucoma:      General: Screening Current          HIV:      General: Screening Not Indicated          Hepatitis C:      General: Screening Current        Advanced Directives:   Patient has no living will for healthcare, does not have durable POA for healthcare, patient does not have an advanced directive       Immunizations:  Patient reviewed and up to date      Influenza: Influenza Due Today, Influenza Recommended Annually and Risks & Benefits Discussed      Pneumococcal: Lifetime Vaccine Completed      Shingrix: Risks & Benefits Discussed and Shingrix Vaccine Needed Today      Hepatitis B (Low risk patients): Series Not Indicated      Zostavax: Zostavax Vaccine UTD      TDAP: Tdap Vaccine UTD

## 2018-11-05 NOTE — ASSESSMENT & PLAN NOTE
No significant issues identified on Medicare wellness examination  Patient is current with screening and immunizations

## 2018-11-23 ENCOUNTER — IMMUNIZATION (OUTPATIENT)
Dept: INTERNAL MEDICINE CLINIC | Facility: CLINIC | Age: 73
End: 2018-11-23
Payer: COMMERCIAL

## 2018-11-23 DIAGNOSIS — Z23 NEED FOR INFLUENZA VACCINATION: Primary | ICD-10-CM

## 2018-11-23 PROCEDURE — 90682 RIV4 VACC RECOMBINANT DNA IM: CPT

## 2018-11-23 PROCEDURE — 4040F PNEUMOC VAC/ADMIN/RCVD: CPT

## 2018-11-23 PROCEDURE — 90471 IMMUNIZATION ADMIN: CPT

## 2019-01-11 ENCOUNTER — TELEPHONE (OUTPATIENT)
Dept: INTERNAL MEDICINE CLINIC | Facility: CLINIC | Age: 74
End: 2019-01-11

## 2019-01-11 DIAGNOSIS — G89.29 CHRONIC BACK PAIN, UNSPECIFIED BACK LOCATION, UNSPECIFIED BACK PAIN LATERALITY: ICD-10-CM

## 2019-01-11 DIAGNOSIS — M54.9 CHRONIC BACK PAIN, UNSPECIFIED BACK LOCATION, UNSPECIFIED BACK PAIN LATERALITY: ICD-10-CM

## 2019-01-11 RX ORDER — MELOXICAM 7.5 MG/1
7.5 TABLET ORAL DAILY PRN
Qty: 30 TABLET | Refills: 0 | Status: SHIPPED | OUTPATIENT
Start: 2019-01-11 | End: 2019-03-12 | Stop reason: SDUPTHER

## 2019-01-24 ENCOUNTER — TELEPHONE (OUTPATIENT)
Dept: INTERNAL MEDICINE CLINIC | Facility: CLINIC | Age: 74
End: 2019-01-24

## 2019-01-24 NOTE — TELEPHONE ENCOUNTER
Patient called today irate due to a billing error on her AWV 11/05/18  She is stating her insurance company is not giving her the "rewards" $25 00 gift card for having annual wellness visit because we billed it incorrectly for the date of service 11/05/18  She states her insurance company contacted the billing office on 1/2/2019 and had the issue resolved and we rebilled it once again incorrectly  The patient is being told by her insurance companu that our billing office sent the visit as a "new visit" rebill (?) as opposed to a rebill for a visit that has occurred already (?)  We have nothing documented here because it seems like it was something that was being handled by our billing and her insurance  Patient was NOT accepting that our office does not handle the billing because we do handle the coding  She insists we handle the issue and she refuses to put anymore effort into our error that is delaying her rewards that are due to her through her insurance company  She will NOT contact our billing office as they routinely direct patient's back to the provider's office for coding issues  Please advise       Thank you-

## 2019-02-01 ENCOUNTER — HOSPITAL ENCOUNTER (OUTPATIENT)
Dept: RADIOLOGY | Age: 74
Discharge: HOME | End: 2019-02-01

## 2019-02-01 ENCOUNTER — TRANSCRIBE ORDERS (OUTPATIENT)
Dept: ADMINISTRATIVE | Facility: HOSPITAL | Age: 74
End: 2019-02-01

## 2019-02-01 DIAGNOSIS — M81.0 AGE RELATED OSTEOPOROSIS, UNSPECIFIED PATHOLOGICAL FRACTURE PRESENCE: ICD-10-CM

## 2019-02-01 DIAGNOSIS — M81.0 AGE RELATED OSTEOPOROSIS, UNSPECIFIED PATHOLOGICAL FRACTURE PRESENCE: Primary | ICD-10-CM

## 2019-03-06 ENCOUNTER — HOSPITAL ENCOUNTER (OUTPATIENT)
Dept: RADIOLOGY | Age: 74
Discharge: HOME | End: 2019-03-06
Payer: COMMERCIAL

## 2019-03-06 DIAGNOSIS — M81.0 AGE RELATED OSTEOPOROSIS, UNSPECIFIED PATHOLOGICAL FRACTURE PRESENCE: ICD-10-CM

## 2019-03-06 PROCEDURE — 77080 DXA BONE DENSITY AXIAL: CPT

## 2019-03-12 ENCOUNTER — TELEPHONE (OUTPATIENT)
Dept: INTERNAL MEDICINE CLINIC | Facility: CLINIC | Age: 74
End: 2019-03-12

## 2019-03-12 DIAGNOSIS — M54.9 CHRONIC BACK PAIN, UNSPECIFIED BACK LOCATION, UNSPECIFIED BACK PAIN LATERALITY: ICD-10-CM

## 2019-03-12 DIAGNOSIS — G89.29 CHRONIC BACK PAIN, UNSPECIFIED BACK LOCATION, UNSPECIFIED BACK PAIN LATERALITY: ICD-10-CM

## 2019-03-12 RX ORDER — MELOXICAM 7.5 MG/1
7.5 TABLET ORAL DAILY PRN
Qty: 30 TABLET | Refills: 0 | Status: SHIPPED | OUTPATIENT
Start: 2019-03-12 | End: 2019-03-14 | Stop reason: SDUPTHER

## 2019-03-12 NOTE — TELEPHONE ENCOUNTER
Dr Aristides Alonso; Patient is calling requesting a call back with the DXA scan results Thursday 3/14/19 due to her schedule        Clinical    She also needs a refill of the following medication PRINTED (she wants to take it somewhere that has a coupon avail and she is not sure where that is yet):    meloxicam (MOBIC) 7 5 mg tablet    Thank you!!

## 2019-03-14 DIAGNOSIS — G89.29 CHRONIC BACK PAIN, UNSPECIFIED BACK LOCATION, UNSPECIFIED BACK PAIN LATERALITY: ICD-10-CM

## 2019-03-14 DIAGNOSIS — M54.9 CHRONIC BACK PAIN, UNSPECIFIED BACK LOCATION, UNSPECIFIED BACK PAIN LATERALITY: ICD-10-CM

## 2019-03-14 DIAGNOSIS — F41.9 ANXIETY: ICD-10-CM

## 2019-03-14 RX ORDER — LORAZEPAM 0.5 MG/1
0.5 TABLET ORAL DAILY PRN
Qty: 90 TABLET | Refills: 0 | Status: SHIPPED | OUTPATIENT
Start: 2019-03-14 | End: 2019-06-26

## 2019-03-14 RX ORDER — MELOXICAM 7.5 MG/1
7.5 TABLET ORAL DAILY PRN
Qty: 30 TABLET | Refills: 2 | Status: SHIPPED | OUTPATIENT
Start: 2019-03-14 | End: 2019-06-26

## 2019-03-15 DIAGNOSIS — H04.123 DRY EYE SYNDROME, BILATERAL: ICD-10-CM

## 2019-03-15 RX ORDER — CYCLOSPORINE 0.5 MG/ML
1 EMULSION OPHTHALMIC EVERY 12 HOURS
Qty: 5.5 ML | Refills: 2 | Status: SHIPPED | OUTPATIENT
Start: 2019-03-15 | End: 2019-03-15 | Stop reason: SDUPTHER

## 2019-03-15 RX ORDER — CYCLOSPORINE 0.5 MG/ML
1 EMULSION OPHTHALMIC EVERY 12 HOURS
Qty: 5.5 ML | Refills: 2 | Status: SHIPPED | OUTPATIENT
Start: 2019-03-15 | End: 2019-04-09 | Stop reason: SDUPTHER

## 2019-03-15 NOTE — TELEPHONE ENCOUNTER
Patient called looking for a refill on the following medication to be sent to [facility] [address]     cycloSPORINE (RESTASIS) 0 05 % ophthalmic emulsion     Patient is requesting single use doses (dropperette?)  She does not want to continue use of the larger bottles  30 day supply with 2 refills requested        Thank you

## 2019-03-26 NOTE — TELEPHONE ENCOUNTER
Patient called back again this morning, months later with this issue still not resolved  She stated that the insurance shows something new resubmitted in December but they couldn't give her the new code of what it was for  She stated [employer] is now changing the AWV policy that they have set up and with it being back in 2018 she might not be able to get the gift card  She wants this resolved as soon as possible  She can be reached at her home phone any day in the morning, [phone]  Please look into  Thank you!

## 2019-03-29 ENCOUNTER — TELEPHONE (OUTPATIENT)
Dept: INTERNAL MEDICINE CLINIC | Facility: CLINIC | Age: 74
End: 2019-03-29

## 2019-04-05 ENCOUNTER — TELEPHONE (OUTPATIENT)
Dept: INTERNAL MEDICINE CLINIC | Age: 74
End: 2019-04-05

## 2019-04-09 DIAGNOSIS — H04.123 DRY EYE SYNDROME, BILATERAL: ICD-10-CM

## 2019-04-10 RX ORDER — CYCLOSPORINE 0.5 MG/ML
EMULSION OPHTHALMIC
Qty: 60 ML | Refills: 0 | Status: SHIPPED | OUTPATIENT
Start: 2019-04-10

## 2019-05-01 DIAGNOSIS — E78.01 FAMILIAL HYPERCHOLESTEROLEMIA: ICD-10-CM

## 2019-05-01 RX ORDER — TRIAMTERENE AND HYDROCHLOROTHIAZIDE 75; 50 MG/1; MG/1
TABLET ORAL
Qty: 90 TABLET | Refills: 1 | Status: SHIPPED | OUTPATIENT
Start: 2019-05-01 | End: 2020-02-12 | Stop reason: SDUPTHER

## 2019-05-09 ENCOUNTER — TELEPHONE (OUTPATIENT)
Dept: INTERNAL MEDICINE CLINIC | Facility: CLINIC | Age: 74
End: 2019-05-09

## 2019-05-09 DIAGNOSIS — I10 ESSENTIAL HYPERTENSION: Primary | ICD-10-CM

## 2019-05-09 DIAGNOSIS — J32.1 CHRONIC FRONTAL SINUSITIS: ICD-10-CM

## 2019-05-09 DIAGNOSIS — E78.00 HYPERCHOLESTEROLEMIA: ICD-10-CM

## 2019-05-09 DIAGNOSIS — E78.01 FAMILIAL HYPERCHOLESTEROLEMIA: ICD-10-CM

## 2019-05-09 DIAGNOSIS — M79.2 NEURALGIA: ICD-10-CM

## 2019-05-09 DIAGNOSIS — Z88.9 MULTIPLE ALLERGIES: ICD-10-CM

## 2019-05-24 ENCOUNTER — APPOINTMENT (OUTPATIENT)
Dept: LAB | Age: 74
End: 2019-05-24
Payer: COMMERCIAL

## 2019-05-24 DIAGNOSIS — E78.01 FAMILIAL HYPERCHOLESTEROLEMIA: ICD-10-CM

## 2019-05-24 DIAGNOSIS — J32.1 CHRONIC FRONTAL SINUSITIS: ICD-10-CM

## 2019-05-24 DIAGNOSIS — M79.2 NEURALGIA: ICD-10-CM

## 2019-05-24 DIAGNOSIS — I10 ESSENTIAL HYPERTENSION: ICD-10-CM

## 2019-05-24 LAB
ALBUMIN SERPL BCP-MCNC: 3.8 G/DL (ref 3.5–5)
ALP SERPL-CCNC: 55 U/L (ref 46–116)
ALT SERPL W P-5'-P-CCNC: 26 U/L (ref 12–78)
ANION GAP SERPL CALCULATED.3IONS-SCNC: 7 MMOL/L (ref 4–13)
AST SERPL W P-5'-P-CCNC: 19 U/L (ref 5–45)
BASOPHILS # BLD AUTO: 0.11 THOUSANDS/ΜL (ref 0–0.1)
BASOPHILS NFR BLD AUTO: 1 % (ref 0–1)
BILIRUB SERPL-MCNC: 0.7 MG/DL (ref 0.2–1)
BUN SERPL-MCNC: 27 MG/DL (ref 5–25)
CALCIUM SERPL-MCNC: 9.6 MG/DL (ref 8.3–10.1)
CHLORIDE SERPL-SCNC: 100 MMOL/L (ref 100–108)
CHOLEST SERPL-MCNC: 219 MG/DL (ref 50–200)
CO2 SERPL-SCNC: 29 MMOL/L (ref 21–32)
CREAT SERPL-MCNC: 0.91 MG/DL (ref 0.6–1.3)
EOSINOPHIL # BLD AUTO: 0.29 THOUSAND/ΜL (ref 0–0.61)
EOSINOPHIL NFR BLD AUTO: 4 % (ref 0–6)
ERYTHROCYTE [DISTWIDTH] IN BLOOD BY AUTOMATED COUNT: 12.2 % (ref 11.6–15.1)
GFR SERPL CREATININE-BSD FRML MDRD: 63 ML/MIN/1.73SQ M
GLUCOSE P FAST SERPL-MCNC: 77 MG/DL (ref 65–99)
HCT VFR BLD AUTO: 41.8 % (ref 34.8–46.1)
HDLC SERPL-MCNC: 97 MG/DL (ref 40–60)
HGB BLD-MCNC: 13.1 G/DL (ref 11.5–15.4)
IMM GRANULOCYTES # BLD AUTO: 0.02 THOUSAND/UL (ref 0–0.2)
IMM GRANULOCYTES NFR BLD AUTO: 0 % (ref 0–2)
LDLC SERPL CALC-MCNC: 104 MG/DL (ref 0–100)
LYMPHOCYTES # BLD AUTO: 2.19 THOUSANDS/ΜL (ref 0.6–4.47)
LYMPHOCYTES NFR BLD AUTO: 28 % (ref 14–44)
MCH RBC QN AUTO: 28.8 PG (ref 26.8–34.3)
MCHC RBC AUTO-ENTMCNC: 31.3 G/DL (ref 31.4–37.4)
MCV RBC AUTO: 92 FL (ref 82–98)
MONOCYTES # BLD AUTO: 0.62 THOUSAND/ΜL (ref 0.17–1.22)
MONOCYTES NFR BLD AUTO: 8 % (ref 4–12)
NEUTROPHILS # BLD AUTO: 4.54 THOUSANDS/ΜL (ref 1.85–7.62)
NEUTS SEG NFR BLD AUTO: 59 % (ref 43–75)
NONHDLC SERPL-MCNC: 122 MG/DL
NRBC BLD AUTO-RTO: 0 /100 WBCS
PLATELET # BLD AUTO: 217 THOUSANDS/UL (ref 149–390)
PMV BLD AUTO: 10.6 FL (ref 8.9–12.7)
POTASSIUM SERPL-SCNC: 3.5 MMOL/L (ref 3.5–5.3)
PROT SERPL-MCNC: 7.2 G/DL (ref 6.4–8.2)
RBC # BLD AUTO: 4.55 MILLION/UL (ref 3.81–5.12)
SODIUM SERPL-SCNC: 136 MMOL/L (ref 136–145)
TRIGL SERPL-MCNC: 92 MG/DL
TSH SERPL DL<=0.05 MIU/L-ACNC: 1.06 UIU/ML (ref 0.36–3.74)
WBC # BLD AUTO: 7.77 THOUSAND/UL (ref 4.31–10.16)

## 2019-05-24 PROCEDURE — 80061 LIPID PANEL: CPT

## 2019-05-24 PROCEDURE — 80053 COMPREHEN METABOLIC PANEL: CPT

## 2019-05-24 PROCEDURE — 85025 COMPLETE CBC W/AUTO DIFF WBC: CPT

## 2019-05-24 PROCEDURE — 36415 COLL VENOUS BLD VENIPUNCTURE: CPT

## 2019-05-24 PROCEDURE — 84443 ASSAY THYROID STIM HORMONE: CPT

## 2019-05-31 ENCOUNTER — TELEPHONE (OUTPATIENT)
Dept: INTERNAL MEDICINE CLINIC | Age: 74
End: 2019-05-31

## 2019-06-17 LAB
LEFT EYE DIABETIC RETINOPATHY: NORMAL
RIGHT EYE DIABETIC RETINOPATHY: NORMAL

## 2019-06-25 PROBLEM — C50.919 MALIGNANT NEOPLASM OF BREAST (HCC): Status: ACTIVE | Noted: 2019-06-25

## 2019-06-26 ENCOUNTER — OFFICE VISIT (OUTPATIENT)
Dept: INTERNAL MEDICINE CLINIC | Facility: CLINIC | Age: 74
End: 2019-06-26
Payer: COMMERCIAL

## 2019-06-26 VITALS
TEMPERATURE: 99.3 F | DIASTOLIC BLOOD PRESSURE: 68 MMHG | BODY MASS INDEX: 23.8 KG/M2 | SYSTOLIC BLOOD PRESSURE: 122 MMHG | HEIGHT: 58 IN | HEART RATE: 77 BPM | WEIGHT: 113.4 LBS | OXYGEN SATURATION: 96 %

## 2019-06-26 DIAGNOSIS — N30.10 CHRONIC INTERSTITIAL CYSTITIS: ICD-10-CM

## 2019-06-26 DIAGNOSIS — G89.29 CHRONIC BACK PAIN, UNSPECIFIED BACK LOCATION, UNSPECIFIED BACK PAIN LATERALITY: ICD-10-CM

## 2019-06-26 DIAGNOSIS — J32.9 CHRONIC SINUSITIS, UNSPECIFIED LOCATION: ICD-10-CM

## 2019-06-26 DIAGNOSIS — I10 ESSENTIAL HYPERTENSION: ICD-10-CM

## 2019-06-26 DIAGNOSIS — C50.919 MALIGNANT NEOPLASM OF FEMALE BREAST, UNSPECIFIED ESTROGEN RECEPTOR STATUS, UNSPECIFIED LATERALITY, UNSPECIFIED SITE OF BREAST (HCC): ICD-10-CM

## 2019-06-26 DIAGNOSIS — Z23 NEED FOR TDAP VACCINATION: ICD-10-CM

## 2019-06-26 DIAGNOSIS — Z12.39 SCREENING FOR MALIGNANT NEOPLASM OF BREAST: ICD-10-CM

## 2019-06-26 DIAGNOSIS — H04.123 DRY EYE SYNDROME OF BOTH EYES: ICD-10-CM

## 2019-06-26 DIAGNOSIS — F41.9 ANXIETY: Primary | ICD-10-CM

## 2019-06-26 DIAGNOSIS — M54.9 CHRONIC BACK PAIN, UNSPECIFIED BACK LOCATION, UNSPECIFIED BACK PAIN LATERALITY: ICD-10-CM

## 2019-06-26 DIAGNOSIS — G89.29 CHRONIC BILATERAL LOW BACK PAIN WITHOUT SCIATICA: ICD-10-CM

## 2019-06-26 DIAGNOSIS — M54.50 CHRONIC BILATERAL LOW BACK PAIN WITHOUT SCIATICA: ICD-10-CM

## 2019-06-26 DIAGNOSIS — N95.2 ATROPHIC VAGINITIS: ICD-10-CM

## 2019-06-26 PROCEDURE — 99214 OFFICE O/P EST MOD 30 MIN: CPT | Performed by: INTERNAL MEDICINE

## 2019-06-26 PROCEDURE — 3078F DIAST BP <80 MM HG: CPT | Performed by: INTERNAL MEDICINE

## 2019-06-26 PROCEDURE — 3074F SYST BP LT 130 MM HG: CPT | Performed by: INTERNAL MEDICINE

## 2019-06-26 PROCEDURE — 1036F TOBACCO NON-USER: CPT | Performed by: INTERNAL MEDICINE

## 2019-06-26 PROCEDURE — 1160F RVW MEDS BY RX/DR IN RCRD: CPT | Performed by: INTERNAL MEDICINE

## 2019-06-26 PROCEDURE — 3008F BODY MASS INDEX DOCD: CPT | Performed by: INTERNAL MEDICINE

## 2019-06-26 RX ORDER — MELOXICAM 7.5 MG/1
7.5 TABLET ORAL DAILY PRN
Qty: 30 TABLET | Refills: 2 | Status: SHIPPED | OUTPATIENT
Start: 2019-06-26 | End: 2019-11-14 | Stop reason: SDUPTHER

## 2019-06-26 RX ORDER — POLYVINYL ALCOHOL, POVIDONE 14; 6 MG/ML; MG/ML
1 SOLUTION/ DROPS OPHTHALMIC 4 TIMES DAILY
COMMUNITY

## 2019-06-26 RX ORDER — LORAZEPAM 0.5 MG/1
0.5 TABLET ORAL DAILY PRN
Qty: 90 TABLET | Refills: 0 | Status: SHIPPED | OUTPATIENT
Start: 2019-06-26 | End: 2019-09-27 | Stop reason: SDUPTHER

## 2019-09-27 DIAGNOSIS — F41.9 ANXIETY: ICD-10-CM

## 2019-09-27 RX ORDER — LORAZEPAM 0.5 MG/1
0.5 TABLET ORAL DAILY PRN
Qty: 90 TABLET | Refills: 0 | Status: SHIPPED | OUTPATIENT
Start: 2019-09-27 | End: 2020-03-23 | Stop reason: SDUPTHER

## 2019-09-27 NOTE — TELEPHONE ENCOUNTER
Ok to fill per PDMP site    Last appt, 6/26/19    Next appt, 12/3/19    Patient was never given Rx that printed on 6/26/19  Please send a new Rx  Thank you!

## 2019-11-14 DIAGNOSIS — M54.9 CHRONIC BACK PAIN, UNSPECIFIED BACK LOCATION, UNSPECIFIED BACK PAIN LATERALITY: ICD-10-CM

## 2019-11-14 DIAGNOSIS — G89.29 CHRONIC BACK PAIN, UNSPECIFIED BACK LOCATION, UNSPECIFIED BACK PAIN LATERALITY: ICD-10-CM

## 2019-11-14 RX ORDER — MELOXICAM 7.5 MG/1
7.5 TABLET ORAL DAILY PRN
Qty: 30 TABLET | Refills: 0 | Status: SHIPPED | OUTPATIENT
Start: 2019-11-14 | End: 2020-03-13 | Stop reason: SDUPTHER

## 2019-11-14 NOTE — TELEPHONE ENCOUNTER
Pt needs a refill on meloxicam (MOBIC) 7 5 mg tablet sent to [facility] on [address]

## 2019-11-29 ENCOUNTER — TELEPHONE (OUTPATIENT)
Dept: INTERNAL MEDICINE CLINIC | Facility: CLINIC | Age: 74
End: 2019-11-29

## 2019-11-29 DIAGNOSIS — J32.9 CHRONIC SINUSITIS, UNSPECIFIED LOCATION: ICD-10-CM

## 2019-11-29 DIAGNOSIS — I10 ESSENTIAL HYPERTENSION: Primary | ICD-10-CM

## 2019-11-29 DIAGNOSIS — E78.00 HYPERCHOLESTEROLEMIA: ICD-10-CM

## 2019-11-29 DIAGNOSIS — M81.0 AGE RELATED OSTEOPOROSIS, UNSPECIFIED PATHOLOGICAL FRACTURE PRESENCE: ICD-10-CM

## 2019-11-29 RX ORDER — NEOMYCIN SULFATE, POLYMYXIN B SULFATE, AND DEXAMETHASONE 3.5; 10000; 1 MG/G; [USP'U]/G; MG/G
OINTMENT OPHTHALMIC
Refills: 1 | COMMUNITY
Start: 2019-10-24

## 2019-11-29 RX ORDER — LIDOCAINE HYDROCHLORIDE 20 MG/ML
JELLY TOPICAL
Refills: 0 | COMMUNITY
Start: 2019-09-30 | End: 2020-12-02 | Stop reason: ALTCHOICE

## 2019-11-29 RX ORDER — FEXOFENADINE HCL AND PSEUDOEPHEDRINE HCI 60; 120 MG/1; MG/1
TABLET, EXTENDED RELEASE ORAL AS NEEDED
COMMUNITY

## 2019-11-29 RX ORDER — LUTEIN 6 MG
TABLET ORAL
COMMUNITY
Start: 2019-11-11

## 2019-11-29 RX ORDER — PROMETHAZINE HYDROCHLORIDE 50 MG/ML
INJECTION, SOLUTION INTRAMUSCULAR; INTRAVENOUS
COMMUNITY
End: 2020-02-14 | Stop reason: ALTCHOICE

## 2019-11-29 RX ORDER — PREDNISOLONE ACETATE 10 MG/ML
1 SUSPENSION/ DROPS OPHTHALMIC EVERY 8 HOURS PRN
COMMUNITY
Start: 2019-07-22

## 2019-11-29 RX ORDER — ASPIRIN 81 MG/1
TABLET ORAL
COMMUNITY
End: 2019-11-29 | Stop reason: SDUPTHER

## 2019-11-29 NOTE — TELEPHONE ENCOUNTER
Pt was not very happy that I called to have her go for labs today  I did explain are only able to prechart 5 days in advance and yesterday was Thanksgiving and we were not in office  Pt stated she had a long list of things she was not happy with  Pt did say she would go for labs

## 2019-12-02 PROBLEM — Z91.09 ENVIRONMENTAL ALLERGIES: Status: ACTIVE | Noted: 2019-12-02

## 2019-12-02 NOTE — PROGRESS NOTES
Assessment/Plan:              No problem-specific Assessment & Plan notes found for this encounter  Diagnoses and all orders for this visit:    Atrophic vaginitis    Chronic bilateral low back pain without sciatica    Anxiety    Medicare annual wellness visit, subsequent    Osteopenia of multiple sites    Other type of osteoarthritis, unspecified site    Other orders  -     BIOTIN PO; Take by mouth  -     CHLOROPHYLL PO  -     Discontinue: GLUCOSAMINE CHONDROITIN COMPLX PO  -     neomycin-polymyxin-dexamethasone (MAXITROL) 0 35%-10,000 units/g-0 1%; APPLY A SMALL AMOUNT ON EYELID AT BEDTIME AS DIRECTED  -     prednisoLONE acetate (PRED FORTE) 1 % ophthalmic suspension; Apply 1 drop to eye every 8 (eight) hours  -     promethazine (PHENERGAN) 50 MG/ML  -     Discontinue: Propylene Glycol-Glycerin 0 6-0 6 % SOLN  -     Discontinue: aspirin (ASPIRIN LOW DOSE) 81 mg EC tablet  -     fexofenadine-pseudoephedrine (ALLEGRA-D)  MG per tablet  -     lidocaine (URO-JET) 2 % urethral/mucosal gel; APPLY TO AFFECTED AREA TWICE A DAY AS NEEDED FOR MILD PAIN  -     Lutein 20 MG TABS  -     Na Sulfate-K Sulfate-Mg Sulf (SUPREP BOWEL PREP KIT) 17 5-3 13-1 6 GM/177ML SOLN          Subjective: No chief complaint on file  Patient ID: Sona No is a 69 y o  female  HPI           She is here today for a follow-up visit  She has a remote history of breast cancer status post radical mastectomy and reconstruction and has been cancer free  She also has a history of osteopenia and gets regular follow-up with DEXA scanning  She has chronic back pain findings radiculopathy due to lumbar facet arthropathy  She has chronically dry eyes and also was recently known to have cataracts  She has an overactive bladder and a diagnosis of interstitial cystitis for which she gets infusions periodically    She had labs done prior to her visit        The following portions of the patient's history were reviewed and updated as appropriate: past family history, past medical history, past social history, past surgical history and problem list     {Common ambulatory SmartLinks:[id number]}    Review of Systems      HENT: Positive for postnasal drip  Eyes: Positive for visual disturbance  Dry eyes   Genitourinary: Positive for frequency and urgency  Musculoskeletal: Positive for back pain  Allergic/Immunologic: Positive for environmental allergies  Psychiatric/Behavioral: The patient is nervous/anxious  All other systems reviewed and are negative      Past Medical History:   Diagnosis Date    Allergic     Chronically dry eyes, bilateral     Colonic polyp     Disorder of bone and cartilage     Dry eye syndrome, bilateral     Eczema     unspecified type     Environmental allergies     Esophageal reflux     Hypertonicity of bladder     Malignant neoplasm of breast (HCC)     Uterine leiomyoma          Current Outpatient Medications:     Lutein 20 MG TABS, , Disp: , Rfl:     Na Sulfate-K Sulfate-Mg Sulf (SUPREP BOWEL PREP KIT) 17 5-3 13-1 6 GM/177ML SOLN, , Disp: , Rfl:     prednisoLONE acetate (PRED FORTE) 1 % ophthalmic suspension, Apply 1 drop to eye every 8 (eight) hours, Disp: , Rfl:     aspirin 81 MG tablet, Take 1 tablet by mouth daily, Disp: , Rfl:     BIOTIN PO, Take by mouth, Disp: , Rfl:     Calcium 500 MG tablet, Take 500 mg by mouth 2 (two) times a day  , Disp: , Rfl:     CHLOROPHYLL PO, , Disp: , Rfl:     Cholecalciferol (VITAMIN D3) 2000 units TABS, Take 1 tablet by mouth daily, Disp: , Rfl:     conjugated estrogens (PREMARIN) vaginal cream, Apply a pea size amount of the estrogen cream to the opening of the vagina three nights per week , Disp: , Rfl:     fexofenadine-pseudoephedrine (ALLEGRA-D)  MG per tablet, , Disp: , Rfl:     fluticasone (FLONASE) 50 mcg/act nasal spray, 2 sprays into each nostril daily, Disp: , Rfl:     gabapentin (NEURONTIN) 100 mg capsule, Take 100 mg by mouth daily , Disp: , Rfl:     Glucosamine-Chondroitin 750-600 MG CHEW, Chew 1 tablet daily, Disp: , Rfl:     HM OMEGA-3-6-9 FATTY ACIDS CAPS, Take 4 capsules by mouth Daily , Disp: , Rfl:     lidocaine (URO-JET) 2 % urethral/mucosal gel, APPLY TO AFFECTED AREA TWICE A DAY AS NEEDED FOR MILD PAIN, Disp: , Rfl: 0    LORazepam (ATIVAN) 0 5 mg tablet, Take 1 tablet (0 5 mg total) by mouth daily as needed for anxiety, Disp: 90 tablet, Rfl: 0    meloxicam (MOBIC) 7 5 mg tablet, Take 1 tablet (7 5 mg total) by mouth daily as needed for mild pain Address additional refills at office visit 12/3/19, Disp: 30 tablet, Rfl: 0    Multiple Vitamins-Minerals (MULTI VITAMIN/MINERALS) TABS, Take 1 tablet by mouth daily, Disp: , Rfl:     neomycin-polymyxin-dexamethasone (MAXITROL) 0 35%-10,000 units/g-0 1%, APPLY A SMALL AMOUNT ON EYELID AT BEDTIME AS DIRECTED, Disp: , Rfl: 1    Olopatadine HCl 0 6 % SOLN, 1 actuation into each nostril daily at bedtime for 180 days, Disp: 1 Bottle, Rfl: 5    phenazopyridine (PYRIDIUM) 95 MG tablet, Take 1 tablet by mouth daily, Disp: , Rfl:     Probiotic Product (PROBIOTIC-10) CHEW, Chew 2 tablets daily , Disp: , Rfl:     promethazine (PHENERGAN) 50 MG/ML, , Disp: , Rfl:     Red Yeast Rice Extract (GNP RED YEAST RICE) 600 MG CAPS, Take 1 capsule by mouth daily, Disp: , Rfl:     REFRESH 1 4-0 6 % SOLN, Apply 1 drop to eye 4 (four) times a day (both eyes), Disp: , Rfl:     RESTASIS 0 05 % ophthalmic emulsion, 1 DROP TO EACH EYE TWICE A DAY, Disp: 60 mL, Rfl: 0    triamterene-hydrochlorothiazide (MAXZIDE) 75-50 MG per tablet, TAKE 1 TABLET BY MOUTH EVERY DAY, Disp: 90 tablet, Rfl: 1    verapamil (CALAN) 40 mg tablet, Take 20 mg by mouth daily  , Disp: , Rfl: 3    vitamin E, tocopherol, 400 units capsule, Take 1 capsule by mouth daily, Disp: , Rfl:     Allergies   Allergen Reactions    Cat Hair Extract Allergic Rhinitis     Allergy to cat hair dander    Diltiazem Other (See Comments) Reaction Date: [id number]; Category: Adverse Reaction;     Dog Epithelium Allergy Skin Test     Dust Mite Extract     Lactose     Molds & Smuts     Other Other (See Comments)     Reaction Date: [id number]; Category: Adverse Reaction;     Penicillins Other (See Comments)     Reaction Date: [id number]; Category: Allergy;     Pollen Extract      Category: Allergy;     Shellfish-Derived Products     Short Ragweed Pollen Ext     Tetracycline     Tree Extract     Cefaclor Rash    Moxifloxacin Rash     Reaction Date: [id number]; Category: Adverse Reaction;   Category: Adverse Reaction;     Prednisone Rash     Reaction Date: [id number]; Category: Allergy;     Sulfa Antibiotics Rash and Other (See Comments)     Reaction Date: [id number]; Category: Allergy; Reaction Date: [id number]; Category: Allergy;     Trimethoprim Rash     Other reaction(s): Contact Dermatitis  Reaction Date: [id number]; Category: Adverse Reaction;        Social History   Past Surgical History:   Procedure Laterality Date    BREAST RECONSTRUCTION Right     with implant prosthesis right breast / 2004   [address] Right     1998    MASTECTOMY, RADICAL Right      Family History   Problem Relation Age of Onset    Heart disease Mother         ischemic     Heart disease Father         ischemic     Kidney disease Father     Uterine cancer Sister     Liver cancer Maternal Grandmother     Colon cancer Paternal Aunt        Objective: There were no vitals taken for this visit  No results found for this or any previous visit (from the past 1344 hour(s))           Physical Exam

## 2019-12-03 ENCOUNTER — OFFICE VISIT (OUTPATIENT)
Dept: INTERNAL MEDICINE CLINIC | Facility: CLINIC | Age: 74
End: 2019-12-03
Payer: COMMERCIAL

## 2019-12-03 VITALS
SYSTOLIC BLOOD PRESSURE: 126 MMHG | HEIGHT: 58 IN | OXYGEN SATURATION: 96 % | TEMPERATURE: 98.8 F | WEIGHT: 114 LBS | DIASTOLIC BLOOD PRESSURE: 64 MMHG | HEART RATE: 80 BPM | BODY MASS INDEX: 23.93 KG/M2

## 2019-12-03 DIAGNOSIS — Z00.00 MEDICARE ANNUAL WELLNESS VISIT, SUBSEQUENT: Primary | ICD-10-CM

## 2019-12-03 DIAGNOSIS — C50.911 MALIGNANT NEOPLASM OF RIGHT FEMALE BREAST, UNSPECIFIED ESTROGEN RECEPTOR STATUS, UNSPECIFIED SITE OF BREAST (HCC): ICD-10-CM

## 2019-12-03 DIAGNOSIS — M47.817 SPONDYLOSIS OF LUMBOSACRAL JOINT WITHOUT MYELOPATHY: ICD-10-CM

## 2019-12-03 DIAGNOSIS — M19.90 OTHER TYPE OF OSTEOARTHRITIS, UNSPECIFIED SITE: ICD-10-CM

## 2019-12-03 DIAGNOSIS — M54.42 CHRONIC BILATERAL LOW BACK PAIN WITH BILATERAL SCIATICA: ICD-10-CM

## 2019-12-03 DIAGNOSIS — M54.41 CHRONIC BILATERAL LOW BACK PAIN WITH BILATERAL SCIATICA: ICD-10-CM

## 2019-12-03 DIAGNOSIS — H04.123 DRY EYE SYNDROME OF BOTH EYES: ICD-10-CM

## 2019-12-03 DIAGNOSIS — G89.29 CHRONIC BILATERAL LOW BACK PAIN WITH BILATERAL SCIATICA: ICD-10-CM

## 2019-12-03 DIAGNOSIS — N95.2 ATROPHIC VAGINITIS: ICD-10-CM

## 2019-12-03 DIAGNOSIS — I10 ESSENTIAL HYPERTENSION: ICD-10-CM

## 2019-12-03 DIAGNOSIS — F41.9 ANXIETY: ICD-10-CM

## 2019-12-03 DIAGNOSIS — M85.89 OSTEOPENIA OF MULTIPLE SITES: ICD-10-CM

## 2019-12-03 PROCEDURE — 1125F AMNT PAIN NOTED PAIN PRSNT: CPT | Performed by: INTERNAL MEDICINE

## 2019-12-03 PROCEDURE — 1170F FXNL STATUS ASSESSED: CPT | Performed by: INTERNAL MEDICINE

## 2019-12-03 PROCEDURE — 99214 OFFICE O/P EST MOD 30 MIN: CPT | Performed by: INTERNAL MEDICINE

## 2019-12-03 PROCEDURE — 3725F SCREEN DEPRESSION PERFORMED: CPT | Performed by: INTERNAL MEDICINE

## 2019-12-03 PROCEDURE — 3078F DIAST BP <80 MM HG: CPT | Performed by: INTERNAL MEDICINE

## 2019-12-03 PROCEDURE — 3008F BODY MASS INDEX DOCD: CPT | Performed by: INTERNAL MEDICINE

## 2019-12-03 PROCEDURE — G0439 PPPS, SUBSEQ VISIT: HCPCS | Performed by: INTERNAL MEDICINE

## 2019-12-03 PROCEDURE — 3074F SYST BP LT 130 MM HG: CPT | Performed by: INTERNAL MEDICINE

## 2019-12-03 PROCEDURE — 1160F RVW MEDS BY RX/DR IN RCRD: CPT | Performed by: INTERNAL MEDICINE

## 2019-12-03 PROCEDURE — 1036F TOBACCO NON-USER: CPT | Performed by: INTERNAL MEDICINE

## 2019-12-03 RX ORDER — CARBOXYMETHYLCELLULOSE SODIUM 5 MG/ML
2 SOLUTION/ DROPS OPHTHALMIC 2 TIMES DAILY PRN
COMMUNITY

## 2019-12-03 NOTE — PATIENT INSTRUCTIONS
Medicare Preventive Visit Patient Instructions  Thank you for completing your Welcome to Medicare Visit or Medicare Annual Wellness Visit today  Your next wellness visit will be due in one year (12/3/2020)  The screening/preventive services that you may require over the next 5-10 years are detailed below  Some tests may not apply to you based off risk factors and/or age  Screening tests ordered at today's visit but not completed yet may show as past due  Also, please note that scanned in results may not display below  Preventive Screenings:  Service Recommendations Previous Testing/Comments   Colorectal Cancer Screening  * Colonoscopy    * Fecal Occult Blood Test (FOBT)/Fecal Immunochemical Test (FIT)  * Fecal DNA/Cologuard Test  * Flexible Sigmoidoscopy Age: 54-65 years old   Colonoscopy: every 10 years (may be performed more frequently if at higher risk)  OR  FOBT/FIT: every 1 year  OR  Cologuard: every 3 years  OR  Sigmoidoscopy: every 5 years  Screening may be recommended earlier than age 48 if at higher risk for colorectal cancer  Also, an individualized decision between you and your healthcare provider will decide whether screening between the ages of 74-80 would be appropriate  Colonoscopy: 01/14/2015  FOBT/FIT: Not on file  Cologuard: Not on file  Sigmoidoscopy: Not on file    Screening Current     Breast Cancer Screening Age: 36 years old  Frequency: every 1-2 years  Not required if history of left and right mastectomy Mammogram: 10/28/2019    History Breast Cancer   Cervical Cancer Screening Between the ages of 21-29, pap smear recommended once every 3 years  Between the ages of 33-67, can perform pap smear with HPV co-testing every 5 years     Recommendations may differ for women with a history of total hysterectomy, cervical cancer, or abnormal pap smears in past  Pap Smear: Not on file    Screening Not Indicated   Hepatitis C Screening Once for adults born between 1945 and 1965  More frequently in patients at high risk for Hepatitis C Hep C Antibody: 08/14/2017    Screening Current   Diabetes Screening 1-2 times per year if you're at risk for diabetes or have pre-diabetes Fasting glucose: 77 mg/dL   A1C: No results in last 5 years    Screening Current   Cholesterol Screening Once every 5 years if you don't have a lipid disorder  May order more often based on risk factors  Lipid panel: 05/24/2019    Screening Not Indicated  History Lipid Disorder     Other Preventive Screenings Covered by Medicare:  1  Abdominal Aortic Aneurysm (AAA) Screening: covered once if your at risk  You're considered to be at risk if you have a family history of AAA  2  Lung Cancer Screening: covers low dose CT scan once per year if you meet all of the following conditions: (1) Age 50-69; (2) No signs or symptoms of lung cancer; (3) Current smoker or have quit smoking within the last 15 years; (4) You have a tobacco smoking history of at least 30 pack years (packs per day multiplied by number of years you smoked); (5) You get a written order from a healthcare provider  3  Glaucoma Screening: covered annually if you're considered high risk: (1) You have diabetes OR (2) Family history of glaucoma OR (3)  aged 48 and older OR (3)  American aged 72 and older  3  Osteoporosis Screening: covered every 2 years if you meet one of the following conditions: (1) You're estrogen deficient and at risk for osteoporosis based off medical history and other findings; (2) Have a vertebral abnormality; (3) On glucocorticoid therapy for more than 3 months; (4) Have primary hyperparathyroidism; (5) On osteoporosis medications and need to assess response to drug therapy  · Last bone density test (DXA Scan): 03/06/2019   5  HIV Screening: covered annually if you're between the age of 15-65  Also covered annually if you are younger than 13 and older than 72 with risk factors for HIV infection   For pregnant patients, it is covered up to 3 times per pregnancy  Immunizations:  Immunization Recommendations   Influenza Vaccine Annual influenza vaccination during flu season is recommended for all persons aged >= 6 months who do not have contraindications   Pneumococcal Vaccine (Prevnar and Pneumovax)  * Prevnar = PCV13  * Pneumovax = PPSV23   Adults 25-60 years old: 1-3 doses may be recommended based on certain risk factors  Adults 72 years old: Prevnar (PCV13) vaccine recommended followed by Pneumovax (PPSV23) vaccine  If already received PPSV23 since turning 65, then PCV13 recommended at least one year after PPSV23 dose  Hepatitis B Vaccine 3 dose series if at intermediate or high risk (ex: diabetes, end stage renal disease, liver disease)   Tetanus (Td) Vaccine - COST NOT COVERED BY MEDICARE PART B Following completion of primary series, a booster dose should be given every 10 years to maintain immunity against tetanus  Td may also be given as tetanus wound prophylaxis  Tdap Vaccine - COST NOT COVERED BY MEDICARE PART B Recommended at least once for all adults  For pregnant patients, recommended with each pregnancy  Shingles Vaccine (Shingrix) - COST NOT COVERED BY MEDICARE PART B  2 shot series recommended in those aged 48 and above     Health Maintenance Due:      Topic Date Due    CRC Screening: Colonoscopy  01/14/2020    MAMMOGRAM  10/28/2020    DXA SCAN  03/06/2021    Hepatitis C Screening  Completed     Immunizations Due:  There are no preventive care reminders to display for this patient  Advance Directives   What are advance directives? Advance directives are legal documents that state your wishes and plans for medical care  These plans are made ahead of time in case you lose your ability to make decisions for yourself  Advance directives can apply to any medical decision, such as the treatments you want, and if you want to donate organs  What are the types of advance directives?   There are many types of advance directives, and each state has rules about how to use them  You may choose a combination of any of the following:  · Living will: This is a written record of the treatment you want  You can also choose which treatments you do not want, which to limit, and which to stop at a certain time  This includes surgery, medicine, IV fluid, and tube feedings  · Durable power of  for healthcare [facility]): This is a written record that states who you want to make healthcare choices for you when you are unable to make them for yourself  This person, called a proxy, is usually a family member or a friend  You may choose more than 1 proxy  · Do not resuscitate (DNR) order:  A DNR order is used in case your heart stops beating or you stop breathing  It is a request not to have certain forms of treatment, such as CPR  A DNR order may be included in other types of advance directives  · Medical directive: This covers the care that you want if you are in a coma, near death, or unable to make decisions for yourself  You can list the treatments you want for each condition  Treatment may include pain medicine, surgery, blood transfusions, dialysis, IV or tube feedings, and a ventilator (breathing machine)  · Values history: This document has questions about your views, beliefs, and how you feel and think about life  This information can help others choose the care that you would choose  Why are advance directives important? An advance directive helps you control your care  Although spoken wishes may be used, it is better to have your wishes written down  Spoken wishes can be misunderstood, or not followed  Treatments may be given even if you do not want them  An advance directive may make it easier for your family to make difficult choices about your care  Urinary Incontinence   Urinary incontinence (UI)  is when you lose control of your bladder  UI develops because your bladder cannot store or empty urine properly   The 3 most common types of UI are stress incontinence, urge incontinence, or both  Medicines:   · May be given to help strengthen your bladder control  Report any side effects of medication to your healthcare provider  Do pelvic muscle exercises often:  Your pelvic muscles help you stop urinating  Squeeze these muscles tight for 5 seconds, then relax for 5 seconds  Gradually work up to squeezing for 10 seconds  Do 3 sets of 15 repetitions a day, or as directed  This will help strengthen your pelvic muscles and improve bladder control  Train your bladder:  Go to the bathroom at set times, such as every 2 hours, even if you do not feel the urge to go  You can also try to hold your urine when you feel the urge to go  For example, hold your urine for 5 minutes when you feel the urge to go  As that becomes easier, hold your urine for 10 minutes  Self-care:   · Keep a UI record  Write down how often you leak urine and how much you leak  Make a note of what you were doing when you leaked urine  · Drink liquids as directed  You may need to limit the amount of liquid you drink to help control your urine leakage  Do not drink any liquid right before you go to bed  Limit or do not have drinks that contain caffeine or alcohol  · Prevent constipation  Eat a variety of high-fiber foods  Good examples are high-fiber cereals, beans, vegetables, and whole-grain breads  Walking is the best way to trigger your intestines to have a bowel movement  · Exercise regularly and maintain a healthy weight  Weight loss and exercise will decrease pressure on your bladder and help you control your leakage  · Use a catheter as directed  to help empty your bladder  A catheter is a tiny, plastic tube that is put into your bladder to drain your urine  · Go to behavior therapy as directed  Behavior therapy may be used to help you learn to control your urge to urinate         © Copyright [employer] 2018 Information is for End User's use only and may not be sold, redistributed or otherwise used for commercial purposes   All illustrations and images included in CareNotes® are the copyrighted property of A D A M , Inc  or [facility] Maria Isabel Kennedy

## 2019-12-03 NOTE — PROGRESS NOTES
Assessment/Plan:        Falls Plan of Care: Balance, strength, and gait training instructions were provided  Essential hypertension  Well controlled  Continue present mgmt    Malignant neoplasm of breast ([address] )  S/P right mastectomy  Scheduled for left mammography    Osteopenia  Continue WBE , vit D supplements    Anxiety  Continue present mgmt including life style changes       Diagnoses and all orders for this visit:    Medicare annual wellness visit, subsequent    Atrophic vaginitis    Chronic bilateral low back pain with bilateral sciatica    Anxiety    Osteopenia of multiple sites    Other type of osteoarthritis, unspecified site    Essential hypertension    Malignant neoplasm of right female breast, unspecified estrogen receptor status, unspecified site of breast (HCC)    Dry eye syndrome of both eyes    Spondylosis of lumbosacral joint without myelopathy    Other orders  -     BIOTIN PO; Take by mouth  -     CHLOROPHYLL PO  -     Discontinue: GLUCOSAMINE CHONDROITIN COMPLX PO  -     neomycin-polymyxin-dexamethasone (MAXITROL) 0 35%-10,000 units/g-0 1%; APPLY A SMALL AMOUNT ON EYELID AT BEDTIME AS DIRECTED  -     prednisoLONE acetate (PRED FORTE) 1 % ophthalmic suspension; Apply 1 drop to eye every 8 (eight) hours  -     promethazine (PHENERGAN) 50 MG/ML  -     Discontinue: Propylene Glycol-Glycerin 0 6-0 6 % SOLN  -     Discontinue: aspirin (ASPIRIN LOW DOSE) 81 mg EC tablet  -     fexofenadine-pseudoephedrine (ALLEGRA-D)  MG per tablet  -     lidocaine (URO-JET) 2 % urethral/mucosal gel; APPLY TO AFFECTED AREA TWICE A DAY AS NEEDED FOR MILD PAIN  -     Lutein 20 MG TABS  -     Na Sulfate-K Sulfate-Mg Sulf (SUPREP BOWEL PREP KIT) 17 5-3 13-1 6 GM/177ML SOLN  -     carboxymethylcellulose (RETAINE CMC) 0 5 % SOLN; 2 drops 2 (two) times a day as needed for dry eyes        HLD=elevated lipid panel (221 cholesterol 96 HDL, 104 LDL) performed on 5/2019, continue with red yeast rice       Osteopenia=pts t-score is -1 7 in the left hip  Consider taking 1200mg of Ca2+ and 800-1000 IU of vitamin D for appropriate tx  Fall Risk=the patient was educated regarding her deteriorating balance and was told to avoid loose rugs  Subjective:   Chief Complaint   Patient presents with    Follow-up     pt is here for a 6M follow up for anxiety and htn   pt did not get bloodwork done due to "late notice" and wather  Informed pt friday   Medicare Wellness Visit     pt is here for a subsequent medicare visit    please code correctly     awv    physical activity     pt states you need to document about physical activity and code appropirately        Patient ID: Daniel Boudreaux is a 69 y o  female     HPI     w/ PMHx of HTN, osteoarthritis, neuralgia, anxiety, eye dryness, stage 1 right breast carcinoma who presents for a medicare annual visit and her 6 month follow up  Pt reports deteriorating balance which is worse when moving slowly  She states that she feels "wobbly" but feels that she does not need a cane or assist device  Pt does report that she has been undergoing an increased amount of stress recently secondary to her 's deteriorating health  She tries to go to her [facility] for exercise once a week to help her destress  Pt denies any other major medical complaints other than dry eyes which has been affecting her for years  Pt denies chest pain, abdominal pain, changes in bladder/bowel function  The following portions of the patient's history were reviewed and updated as appropriate: past family history, past medical history, past social history, past surgical history and problem list     Review of Systems   Constitutional: Positive for activity change  Eyes: Positive for redness  Dryness   Genitourinary: Positive for urgency  Musculoskeletal: Positive for arthralgias, back pain and gait problem  Allergic/Immunologic: Positive for environmental allergies     Psychiatric/Behavioral: The patient is nervous/anxious  All other systems reviewed and are negative  Objective:      /64 (BP Location: Left arm, Patient Position: Sitting, Cuff Size: Adult)   Pulse 80   Temp 98 8 °F (37 1 °C) (Oral)   Ht 4' 10 07" (1 475 m)   Wt 51 7 kg (114 lb)   SpO2 96% Comment: room air  BMI 23 77 kg/m²          Physical Exam   Constitutional: She is oriented to person, place, and time  She appears well-developed and well-nourished  No distress  HENT:   Head: Normocephalic and atraumatic  Eyes: Pupils are equal, round, and reactive to light  EOM are normal  Right eye exhibits no discharge  Left eye exhibits no discharge  Right conjunctiva is injected  Left conjunctiva is injected  No scleral icterus  Neck: Normal range of motion  Neck supple  Cardiovascular: Normal rate, regular rhythm and normal heart sounds  Pulmonary/Chest: Effort normal and breath sounds normal    Abdominal: Soft  Bowel sounds are normal    Musculoskeletal: Normal range of motion  Neurological: She is alert and oriented to person, place, and time  She has normal strength  She displays a negative Romberg sign  Skin: Skin is warm and dry  She is not diaphoretic  Psychiatric: She has a normal mood and affect   Her behavior is normal  Judgment and thought content normal

## 2019-12-03 NOTE — PROGRESS NOTES
Assessment and Plan:     Problem List Items Addressed This Visit        Musculoskeletal and Integument    Osteoarthritis    Osteopenia       Genitourinary    Atrophic vaginitis - Primary       Other    Anxiety    Bilateral low back pain without sciatica    Medicare annual wellness visit, subsequent           Preventive health issues were discussed with patient, and age appropriate screening tests were ordered as noted in patient's After Visit Summary  Personalized health advice and appropriate referrals for health education or preventive services given if needed, as noted in patient's After Visit Summary       History of Present Illness:     Patient presents for Medicare Annual Wellness visit    Patient Care Team:  Collette Hollow, MD as PCP - General (Internal Medicine)     Problem List:     Patient Active Problem List   Diagnosis    Essential hypertension    Chronic interstitial cystitis    Allergy to animal dander    Allergy to trees    Anxiety    Atrophic vaginitis    Bilateral low back pain without sciatica    Chronic sinusitis    Deviated nasal septum    Family history of ischemic heart disease (IHD)    Hot flashes    Hypertonicity of bladder    Neuralgia    Osteoarthritis    Osteopenia    Personal history of breast cancer    PFD (pelvic floor dysfunction)    Spondylosis of lumbosacral joint without myelopathy    Thoracic or lumbosacral neuritis or radiculitis    Dry eye syndrome of both eyes    Medicare annual wellness visit, subsequent    Malignant neoplasm of breast ([facility] [address]    Environmental allergies      Past Medical and Surgical History:     Past Medical History:   Diagnosis Date    Allergic     Chronically dry eyes, bilateral     Colonic polyp     Disorder of bone and cartilage     Dry eye syndrome, bilateral     Eczema     unspecified type     Environmental allergies     Esophageal reflux     Hypertonicity of bladder     Malignant neoplasm of breast ([address] )     Uterine leiomyoma      Past Surgical History:   Procedure Laterality Date    BREAST RECONSTRUCTION Right     with implant prosthesis right breast / 2004   [address] Right     1998    MASTECTOMY, RADICAL Right       Family History:     Family History   Problem Relation Age of Onset    Heart disease Mother         ischemic     Heart disease Father         ischemic     Kidney disease Father     Uterine cancer Sister     Liver cancer Maternal Grandmother     Colon cancer Paternal Aunt       Social History:     Social History     Socioeconomic History    Marital status: /Civil Union     Spouse name: Not on file    Number of children: Not on file    Years of education: Not on file    Highest education level: Not on file   Occupational History    Not on file   Social Needs    Financial resource strain: Not on file    Food insecurity:     Worry: Not on file     Inability: Not on file    Transportation needs:     Medical: Not on file     Non-medical: Not on file   Tobacco Use    Smoking status: Never Smoker    Smokeless tobacco: Never Used   Substance and Sexual Activity    Alcohol use: No    Drug use: No    Sexual activity: Not on file   Lifestyle    Physical activity:     Days per week: Not on file     Minutes per session: Not on file    Stress: Not on file   Relationships    Social connections:     Talks on phone: Not on file     Gets together: Not on file     Attends Congregational service: Not on file     Active member of club or organization: Not on file     Attends meetings of clubs or organizations: Not on file     Relationship status: Not on file    Intimate partner violence:     Fear of current or ex partner: Not on file     Emotionally abused: Not on file     Physically abused: Not on file     Forced sexual activity: Not on file   Other Topics Concern    Not on file   Social History Narrative    Not on file       Medications and Allergies: Current Outpatient Medications   Medication Sig Dispense Refill    Lutein 20 MG TABS       Na Sulfate-K Sulfate-Mg Sulf (SUPREP BOWEL PREP KIT) 17 5-3 13-1 6 GM/177ML SOLN       prednisoLONE acetate (PRED FORTE) 1 % ophthalmic suspension Apply 1 drop to eye every 8 (eight) hours      aspirin 81 MG tablet Take 1 tablet by mouth daily      BIOTIN PO Take by mouth      Calcium 500 MG tablet Take 500 mg by mouth 2 (two) times a day        CHLOROPHYLL PO       Cholecalciferol (VITAMIN D3) 2000 units TABS Take 1 tablet by mouth daily      conjugated estrogens (PREMARIN) vaginal cream Apply a pea size amount of the estrogen cream to the opening of the vagina three nights per week        fexofenadine-pseudoephedrine (ALLEGRA-D)  MG per tablet       fluticasone (FLONASE) 50 mcg/act nasal spray 2 sprays into each nostril daily      gabapentin (NEURONTIN) 100 mg capsule Take 100 mg by mouth daily       Glucosamine-Chondroitin 750-600 MG CHEW Chew 1 tablet daily      HM OMEGA-3-6-9 FATTY ACIDS CAPS Take 4 capsules by mouth Daily       lidocaine (URO-JET) 2 % urethral/mucosal gel APPLY TO AFFECTED AREA TWICE A DAY AS NEEDED FOR MILD PAIN  0    LORazepam (ATIVAN) 0 5 mg tablet Take 1 tablet (0 5 mg total) by mouth daily as needed for anxiety 90 tablet 0    meloxicam (MOBIC) 7 5 mg tablet Take 1 tablet (7 5 mg total) by mouth daily as needed for mild pain Address additional refills at office visit 12/3/19 30 tablet 0    Multiple Vitamins-Minerals (MULTI VITAMIN/MINERALS) TABS Take 1 tablet by mouth daily      neomycin-polymyxin-dexamethasone (MAXITROL) 0 35%-10,000 units/g-0 1% APPLY A SMALL AMOUNT ON EYELID AT BEDTIME AS DIRECTED  1    Olopatadine HCl 0 6 % SOLN 1 actuation into each nostril daily at bedtime for 180 days 1 Bottle 5    phenazopyridine (PYRIDIUM) 95 MG tablet Take 1 tablet by mouth daily      Probiotic Product (PROBIOTIC-10) CHEW Chew 2 tablets daily       promethazine (PHENERGAN) 50 MG/ML       Red Yeast Rice Extract (GNP RED YEAST RICE) 600 MG CAPS Take 1 capsule by mouth daily      REFRESH 1 4-0 6 % SOLN Apply 1 drop to eye 4 (four) times a day (both eyes)      RESTASIS 0 05 % ophthalmic emulsion 1 DROP TO EACH EYE TWICE A DAY 60 mL 0    triamterene-hydrochlorothiazide (MAXZIDE) 75-50 MG per tablet TAKE 1 TABLET BY MOUTH EVERY DAY 90 tablet 1    verapamil (CALAN) 40 mg tablet Take 20 mg by mouth daily    3    vitamin E, tocopherol, 400 units capsule Take 1 capsule by mouth daily       No current facility-administered medications for this visit  Allergies   Allergen Reactions    Cat Hair Extract Allergic Rhinitis     Allergy to cat hair dander    Diltiazem Other (See Comments)     Reaction Date: [id number]; Category: Adverse Reaction;     Dog Epithelium Allergy Skin Test     Dust Mite Extract     Lactose     Levofloxacin     Molds & Smuts     Other Other (See Comments)     Reaction Date: [id number]; Category: Adverse Reaction;     Penicillins Other (See Comments)     Reaction Date: [id number]; Category: Allergy;     Pollen Extract      Category: Allergy;     Shellfish-Derived Products     Short Ragweed Pollen Ext     Tetracycline     Tree Extract     Cefaclor Rash    Moxifloxacin Rash     Reaction Date: [id number]; Category: Adverse Reaction;   Category: Adverse Reaction;     Prednisone Rash     Reaction Date: [id number]; Category: Allergy;     Sulfa Antibiotics Rash and Other (See Comments)     Reaction Date: [id number]; Category: Allergy; Reaction Date: [id number]; Category: Allergy;     Trimethoprim Rash     Other reaction(s): Contact Dermatitis  Reaction Date: [id number]; Category:  Adverse Reaction;       Immunizations:     Immunization History   Administered Date(s) Administered    INFLUENZA 10/17/2014, 09/29/2015, 10/12/2016, 10/11/2017, 09/30/2019    Influenza Quadrivalent Preservative Free 3 years and older IM 10/11/2017    Influenza TIV (IM) 09/29/2015    Influenza, recombinant, quadrivalent,injectable, preservative free 11/23/2018    Pneumococcal Conjugate 13-Valent 06/22/2015    Pneumococcal Polysaccharide PPV23 01/01/2007, 06/12/2012    TD (adult) Preservative Free 10/21/2008    Td (adult), adsorbed 04/02/2008, 10/21/2008    Tetanus, adsorbed 10/21/2008    Zoster 12/24/2008, 06/10/2009      Health Maintenance:         Topic Date Due    CRC Screening: Colonoscopy  01/14/2020    MAMMOGRAM  10/28/2020    DXA SCAN  03/06/2021    Hepatitis C Screening  Completed     There are no preventive care reminders to display for this patient  Medicare Health Risk Assessment:     /64 (BP Location: Left arm, Patient Position: Sitting, Cuff Size: Adult)   Pulse 80   Temp 98 8 °F (37 1 °C) (Oral)   Ht 4' 10 07" (1 475 m)   Wt 51 7 kg (114 lb)   SpO2 96% Comment: room air  BMI 23 77 kg/m²      Last Medicare Wellness visit information reviewed, patient interviewed, no change since last AWV  Health Risk Assessment:   Patient rates overall health as good  Patient feels that their physical health rating is same  Eyesight was rated as much worse  Hearing was rated as slightly worse  Patient feels that their emotional and mental health rating is same  Pain experienced in the last 7 days has been some  Patient's pain rating has been 4/10  Patient states that she has experienced no weight loss or gain in last 6 months  Depression Screening:   PHQ-2 Score: 1      Fall Risk Screening: In the past year, patient has experienced: no history of falling in past year      Urinary Incontinence Screening:   Patient has leaked urine accidently in the last six months  Home Safety:  Patient has trouble with stairs inside or outside of their home  Patient has working smoke alarms and has no working carbon monoxide detector  Home safety hazards include: none  Nutrition:   Current diet is Regular, Limited junk food and No Added Salt       Medications:   Patient is currently taking over-the-counter supplements  OTC medications include: see medication list  Patient is able to manage medications  Activities of Daily Living (ADLs)/Instrumental Activities of Daily Living (IADLs):   Walk and transfer into and out of bed and chair?: Yes  Dress and groom yourself?: Yes    Bathe or shower yourself?: Yes    Feed yourself? Yes  Do your laundry/housekeeping?: Yes  Manage your money, pay your bills and track your expenses?: Yes  Make your own meals?: Yes    Do your own shopping?: Yes    Previous Hospitalizations:   Any hospitalizations or ED visits within the last 12 months?: No      Advance Care Planning:   Living will: No    Durable POA for healthcare: No    Advanced directive: No      PREVENTIVE SCREENINGS      Cardiovascular Screening:    General: History Lipid Disorder and Screening Current      Diabetes Screening:     General: Screening Current      Colorectal Cancer Screening:     General: Screening Current      Breast Cancer Screening:     General: History Breast Cancer    Due for: Mammogram        Cervical Cancer Screening:    General: Screening Not Indicated      Osteoporosis Screening:    General: Screening Current      Abdominal Aortic Aneurysm (AAA) Screening:        General: Screening Not Indicated      Lung Cancer Screening:     General: Screening Not Indicated      Hepatitis C Screening:    General: Screening Current    Other Counseling Topics:   Skin self-exam and regular weightbearing exercise and calcium and vitamin D intake         Patricia Canales MD

## 2019-12-05 DIAGNOSIS — G89.29 CHRONIC BACK PAIN, UNSPECIFIED BACK LOCATION, UNSPECIFIED BACK PAIN LATERALITY: ICD-10-CM

## 2019-12-05 DIAGNOSIS — M54.9 CHRONIC BACK PAIN, UNSPECIFIED BACK LOCATION, UNSPECIFIED BACK PAIN LATERALITY: ICD-10-CM

## 2019-12-05 RX ORDER — MELOXICAM 7.5 MG/1
TABLET ORAL
Qty: 30 TABLET | Refills: 0 | OUTPATIENT
Start: 2019-12-05

## 2019-12-10 ENCOUNTER — LAB (OUTPATIENT)
Dept: LAB | Age: 74
End: 2019-12-10
Payer: COMMERCIAL

## 2019-12-10 DIAGNOSIS — E78.00 HYPERCHOLESTEROLEMIA: ICD-10-CM

## 2019-12-10 DIAGNOSIS — M81.0 AGE RELATED OSTEOPOROSIS, UNSPECIFIED PATHOLOGICAL FRACTURE PRESENCE: ICD-10-CM

## 2019-12-10 DIAGNOSIS — I10 ESSENTIAL HYPERTENSION: ICD-10-CM

## 2019-12-10 LAB
25(OH)D3 SERPL-MCNC: 77 NG/ML (ref 30–100)
ANION GAP SERPL CALCULATED.3IONS-SCNC: 4 MMOL/L (ref 4–13)
BUN SERPL-MCNC: 15 MG/DL (ref 5–25)
CALCIUM SERPL-MCNC: 10 MG/DL (ref 8.3–10.1)
CHLORIDE SERPL-SCNC: 99 MMOL/L (ref 100–108)
CHOLEST SERPL-MCNC: 247 MG/DL (ref 50–200)
CO2 SERPL-SCNC: 32 MMOL/L (ref 21–32)
CREAT SERPL-MCNC: 0.84 MG/DL (ref 0.6–1.3)
GFR SERPL CREATININE-BSD FRML MDRD: 69 ML/MIN/1.73SQ M
GLUCOSE P FAST SERPL-MCNC: 76 MG/DL (ref 65–99)
HDLC SERPL-MCNC: 124 MG/DL
LDLC SERPL CALC-MCNC: 111 MG/DL (ref 0–100)
POTASSIUM SERPL-SCNC: 3.3 MMOL/L (ref 3.5–5.3)
SODIUM SERPL-SCNC: 135 MMOL/L (ref 136–145)
TRIGL SERPL-MCNC: 60 MG/DL

## 2019-12-10 PROCEDURE — 82306 VITAMIN D 25 HYDROXY: CPT

## 2019-12-10 PROCEDURE — 80061 LIPID PANEL: CPT

## 2019-12-10 PROCEDURE — 36415 COLL VENOUS BLD VENIPUNCTURE: CPT

## 2019-12-10 PROCEDURE — 80048 BASIC METABOLIC PNL TOTAL CA: CPT

## 2019-12-16 ENCOUNTER — TELEPHONE (OUTPATIENT)
Dept: INTERNAL MEDICINE CLINIC | Facility: CLINIC | Age: 74
End: 2019-12-16

## 2019-12-16 NOTE — TELEPHONE ENCOUNTER
Pt would like a call back in regards to questions she has on her bloodwork  If someone can give the pt a call back! Thanks!

## 2020-02-12 DIAGNOSIS — E78.01 FAMILIAL HYPERCHOLESTEROLEMIA: ICD-10-CM

## 2020-02-13 RX ORDER — TRIAMTERENE AND HYDROCHLOROTHIAZIDE 75; 50 MG/1; MG/1
1 TABLET ORAL DAILY
Qty: 90 TABLET | Refills: 1 | Status: SHIPPED | OUTPATIENT
Start: 2020-02-13 | End: 2020-09-29 | Stop reason: SDUPTHER

## 2020-02-14 ENCOUNTER — OFFICE VISIT (OUTPATIENT)
Dept: INTERNAL MEDICINE CLINIC | Facility: CLINIC | Age: 75
End: 2020-02-14
Payer: COMMERCIAL

## 2020-02-14 VITALS
TEMPERATURE: 98.2 F | WEIGHT: 114.4 LBS | HEART RATE: 68 BPM | HEIGHT: 58 IN | OXYGEN SATURATION: 97 % | DIASTOLIC BLOOD PRESSURE: 66 MMHG | SYSTOLIC BLOOD PRESSURE: 116 MMHG | BODY MASS INDEX: 24.01 KG/M2

## 2020-02-14 DIAGNOSIS — Z86.010 HISTORY OF COLON POLYPS: Primary | ICD-10-CM

## 2020-02-14 DIAGNOSIS — J01.00 SUBACUTE MAXILLARY SINUSITIS: ICD-10-CM

## 2020-02-14 PROBLEM — H04.123 DRY EYE SYNDROME OF BILATERAL LACRIMAL GLANDS: Status: ACTIVE | Noted: 2020-02-14

## 2020-02-14 PROBLEM — H02.88A MEIBOMIAN GLAND DYSFUNCTION RIGHT EYE, UPPER AND LOWER EYELIDS: Status: ACTIVE | Noted: 2020-02-14

## 2020-02-14 PROBLEM — H25.13 AGE-RELATED NUCLEAR CATARACT, BILATERAL: Status: ACTIVE | Noted: 2020-02-14

## 2020-02-14 PROBLEM — H18.599 OTHER HEREDITARY CORNEAL DYSTROPHIES: Status: ACTIVE | Noted: 2020-02-14

## 2020-02-14 PROCEDURE — 1036F TOBACCO NON-USER: CPT | Performed by: INTERNAL MEDICINE

## 2020-02-14 PROCEDURE — 3078F DIAST BP <80 MM HG: CPT | Performed by: INTERNAL MEDICINE

## 2020-02-14 PROCEDURE — 1160F RVW MEDS BY RX/DR IN RCRD: CPT | Performed by: INTERNAL MEDICINE

## 2020-02-14 PROCEDURE — 3074F SYST BP LT 130 MM HG: CPT | Performed by: INTERNAL MEDICINE

## 2020-02-14 PROCEDURE — 4040F PNEUMOC VAC/ADMIN/RCVD: CPT | Performed by: INTERNAL MEDICINE

## 2020-02-14 PROCEDURE — 3008F BODY MASS INDEX DOCD: CPT | Performed by: INTERNAL MEDICINE

## 2020-02-14 PROCEDURE — 99213 OFFICE O/P EST LOW 20 MIN: CPT | Performed by: INTERNAL MEDICINE

## 2020-02-14 RX ORDER — CLARITHROMYCIN 250 MG/1
250 TABLET, FILM COATED ORAL EVERY 12 HOURS SCHEDULED
Qty: 20 TABLET | Refills: 0 | Status: SHIPPED | OUTPATIENT
Start: 2020-02-14 | End: 2020-02-24

## 2020-02-14 RX ORDER — DEXTROMETHORPHAN HYDROBROMIDE AND PROMETHAZINE HYDROCHLORIDE 15; 6.25 MG/5ML; MG/5ML
5 SOLUTION ORAL 4 TIMES DAILY PRN
Qty: 180 ML | Refills: 1 | Status: SHIPPED | OUTPATIENT
Start: 2020-02-14 | End: 2020-04-02 | Stop reason: RX

## 2020-02-14 RX ORDER — CODEINE PHOSPHATE AND GUAIFENESIN 10; 100 MG/5ML; MG/5ML
SOLUTION ORAL
Qty: 1 ML | Refills: 0 | OUTPATIENT
Start: 2020-02-14

## 2020-02-14 RX ORDER — CHLORPHENIRAMINE MALEATE 4 MG/1
4 TABLET ORAL EVERY 6 HOURS PRN
Qty: 30 TABLET | Refills: 0 | Status: SHIPPED | OUTPATIENT
Start: 2020-02-14 | End: 2020-12-02 | Stop reason: ALTCHOICE

## 2020-02-14 NOTE — PROGRESS NOTES
Assessment/Plan:    No problem-specific Assessment & Plan notes found for this encounter  Diagnoses and all orders for this visit:    History of colon polyps  -     Ambulatory referral to Colorectal Surgery; Future    Subacute maxillary sinusitis  -     clarithromycin (BIAXIN) 250 mg tablet; Take 1 tablet (250 mg total) by mouth every 12 (twelve) hours for 10 days  -     chlorpheniramine (CHLOR-TRIMETON) 4 MG tablet; Take 1 tablet (4 mg total) by mouth every 6 (six) hours as needed for rhinitis for up to 8 days  -     Promethazine-DM (PHENERGAN-DM) 6 25-15 mg/5 mL oral syrup; Take 5 mL by mouth 4 (four) times a day as needed for cough          Subjective:      Patient ID: Sabina Burgos is a 69 y o  female  The patient presents to the office with complaints of sinus pressure and discomfort  She denies pain  She denies fever  She believes she may have a postnasal drip  She has no sore throat  She has been taking Allegra D with minimal improvement  She denies any earache, chills, night sweats  She has no headache she has a mild nonproductive cough  She has been using Flonase on a regular basis with no major improvement  She denies any nausea, vomiting or diarrhea        Family History   Problem Relation Age of Onset    Heart disease Mother         ischemic     Heart disease Father         ischemic     Kidney disease Father     Uterine cancer Sister     Liver cancer Sister     Lymphoma Sister     Liver cancer Maternal Grandmother     Colon cancer Paternal Aunt      Social History     Socioeconomic History    Marital status: /Civil Union     Spouse name: Not on file    Number of children: Not on file    Years of education: Not on file    Highest education level: Not on file   Occupational History    Not on file   Social Needs    Financial resource strain: Not on file    Food insecurity:     Worry: Not on file     Inability: Not on file    Transportation needs:     Medical: Not on file     Non-medical: Not on file   Tobacco Use    Smoking status: Never Smoker    Smokeless tobacco: Never Used   Substance and Sexual Activity    Alcohol use: No    Drug use: No    Sexual activity: Not on file   Lifestyle    Physical activity:     Days per week: Not on file     Minutes per session: Not on file    Stress: Not on file   Relationships    Social connections:     Talks on phone: Not on file     Gets together: Not on file     Attends Sikhism service: Not on file     Active member of club or organization: Not on file     Attends meetings of clubs or organizations: Not on file     Relationship status: Not on file    Intimate partner violence:     Fear of current or ex partner: Not on file     Emotionally abused: Not on file     Physically abused: Not on file     Forced sexual activity: Not on file   Other Topics Concern    Not on file   Social History Narrative    Not on file     Past Medical History:   Diagnosis Date    Allergic     Chronically dry eyes, bilateral     Colonic polyp     Disorder of bone and cartilage     Dry eye syndrome, bilateral     Eczema     unspecified type     Environmental allergies     Esophageal reflux     Hypertonicity of bladder     Malignant ne[facility] of [facility] ([facility] [address]     Uterine leiomyoma        Current Outpatient Medications:     aspirin 81 MG tablet, Take 1 tablet by mouth daily, Disp: , Rfl:     BIOTIN PO, Take by mouth, Disp: , Rfl:     Calcium 500 MG tablet, Take 500 mg by mouth daily , Disp: , Rfl:     carboxymethylcellulose (RETAINE CMC) 0 5 % SOLN, 2 drops 2 (two) times a day as needed for dry eyes, Disp: , Rfl:     CHLOROPHYLL PO, , Disp: , Rfl:     Cholecalciferol (VITAMIN D3) 2000 units TABS, Take 1 tablet by mouth daily, Disp: , Rfl:     conjugated estrogens (PREMARIN) vaginal cream, Apply a pea size amount of the estrogen cream to the opening of the vagina three nights per week , Disp: , Rfl:     fexofenadine-pseudoephedrine (ALLEGRA-D)  MG per tablet, , Disp: , Rfl:     fluticasone (FLONASE) 50 mcg/act nasal spray, 2 sprays into each nostril daily, Disp: , Rfl:     gabapentin (NEURONTIN) 100 mg capsule, Take 100 mg by mouth daily , Disp: , Rfl:     Glucosamine-Chondroitin 750-600 MG CHEW, Chew 1 tablet daily, Disp: , Rfl:     HM OMEGA-3-6-9 FATTY ACIDS CAPS, Take 4 capsules by mouth Daily , Disp: , Rfl:     lidocaine (URO-JET) 2 % urethral/mucosal gel, APPLY TO AFFECTED AREA TWICE A DAY AS NEEDED FOR MILD PAIN, Disp: , Rfl: 0    LORazepam (ATIVAN) 0 5 mg tablet, Take 1 tablet (0 5 mg total) by mouth daily as needed for anxiety, Disp: 90 tablet, Rfl: 0    Lutein 20 MG TABS, , Disp: , Rfl:     meloxicam (MOBIC) 7 5 mg tablet, Take 1 tablet (7 5 mg total) by mouth daily as needed for mild pain Address additional refills at office visit 12/3/19, Disp: 30 tablet, Rfl: 0    Multiple Vitamins-Minerals (MULTI VITAMIN/MINERALS) TABS, Take 1 tablet by mouth daily, Disp: , Rfl:     phenazopyridine (PYRIDIUM) 95 MG tablet, Take 1 tablet by mouth daily, Disp: , Rfl:     prednisoLONE acetate (PRED FORTE) 1 % ophthalmic suspension, Apply 1 drop to eye every 8 (eight) hours, Disp: , Rfl:     Probiotic Product (PROBIOTIC-10) CHEW, Chew 1 tablet daily , Disp: , Rfl:     Red Yeast Rice Extract (GNP RED YEAST RICE) 600 MG CAPS, Take 1 capsule by mouth daily, Disp: , Rfl:     REFRESH 1 4-0 6 % SOLN, Apply 1 drop to eye 4 (four) times a day (both eyes), Disp: , Rfl:     RESTASIS 0 05 % ophthalmic emulsion, 1 DROP TO EACH EYE TWICE A DAY, Disp: 60 mL, Rfl: 0    triamterene-hydrochlorothiazide (MAXZIDE) 75-50 MG per tablet, Take 1 tablet by mouth daily, Disp: 90 tablet, Rfl: 1    verapamil (CALAN) 40 mg tablet, Take 20 mg by mouth daily  , Disp: , Rfl: 3    vitamin E, tocopherol, 400 units capsule, Take 1 capsule by mouth daily, Disp: , Rfl:     chlorpheniramine (CHLOR-TRIMETON) 4 MG tablet, Take 1 tablet (4 mg total) by mouth every 6 (six) hours as needed for rhinitis for up to 8 days, Disp: 30 tablet, Rfl: 0    clarithromycin (BIAXIN) 250 mg tablet, Take 1 tablet (250 mg total) by mouth every 12 (twelve) hours for 10 days, Disp: 20 tablet, Rfl: 0    neomycin-polymyxin-dexamethasone (MAXITROL) 0 35%-10,000 units/g-0 1%, APPLY A SMALL AMOUNT ON EYELID AT BEDTIME AS DIRECTED, Disp: , Rfl: 1    Olopatadine HCl 0 6 % SOLN, 1 actuation into each nostril daily at bedtime for 180 days, Disp: 1 Bottle, Rfl: 5    Promethazine-DM (PHENERGAN-DM) 6 25-15 mg/5 mL oral syrup, Take 5 mL by mouth 4 (four) times a day as needed for cough, Disp: 180 mL, Rfl: 1  Allergies   Allergen Reactions    Cat Hair Extract Allergic Rhinitis     Allergy to cat hair dander    Diltiazem Other (See Comments)     Reaction Date: [id number]; Category: Adverse Reaction;     Dog Epithelium Allergy Skin Test     Dust Mite Extract     Lactose     Levofloxacin     Molds & Smuts     Other Other (See Comments)     Reaction Date: [id number]; Category: Adverse Reaction;     Penicillins Other (See Comments)     Reaction Date: [id number]; Category: Allergy;     Pollen Extract      Category: Allergy;     Shellfish-Derived Products     Short Ragweed Pollen Ext     Tetracycline     Tree Extract     Cefaclor Rash    Moxifloxacin Rash     Reaction Date: [id number]; Category: Adverse Reaction;   Category: Adverse Reaction;     Prednisone Rash     Reaction Date: [id number]; Category: Allergy;     Sulfa Antibiotics Rash and Other (See Comments)     Reaction Date: [id number]; Category: Allergy; Reaction Date: [id number]; Category: Allergy;     Trimethoprim Rash     Other reaction(s): Contact Dermatitis  Reaction Date: [id number]; Category:  Adverse Reaction;      Past Surgical History:   Procedure Laterality Date    BREAST RECONSTRUCTION Right     with implant prosthesis right breast / 2004   [id number] [facility] MASTECTOMY, RADICAL Right          Review of Systems   Constitutional: Negative  HENT: Positive for congestion and sinus pressure  Negative for hearing loss, nosebleeds, postnasal drip, rhinorrhea, sinus pain, sneezing, sore throat, tinnitus, trouble swallowing and voice change  Eyes: Negative  Respiratory: Negative  Cardiovascular: Negative  Gastrointestinal: Negative  Genitourinary: Negative  Musculoskeletal: Negative  Neurological: Negative  Hematological: Negative  Psychiatric/Behavioral: Negative  Objective:      /66 (BP Location: Left arm, Patient Position: Sitting, Cuff Size: Standard)   Pulse 68   Temp 98 2 °F (36 8 °C) (Oral)   Ht 4' 10 19" (1 478 m)   Wt 51 9 kg (114 lb 6 4 oz)   SpO2 97%   BMI 23 75 kg/m²          Physical Exam   Constitutional: She is oriented to person, place, and time  She appears well-developed and well-nourished  No distress  HENT:   Head: Normocephalic and atraumatic  Right Ear: External ear normal    Left Ear: External ear normal    Nose: Mucosal edema (With mucopurulent sputum noted on the left side) present  Right sinus exhibits maxillary sinus tenderness  Left sinus exhibits maxillary sinus tenderness  Mouth/Throat: Oropharynx is clear and moist    Eyes: Pupils are equal, round, and reactive to light  Conjunctivae are normal  Right eye exhibits no discharge  Left eye exhibits no discharge  No scleral icterus  Neck: Neck supple  No JVD present  No tracheal deviation present  Cardiovascular: Normal rate, regular rhythm and normal heart sounds  Pulmonary/Chest: Effort normal and breath sounds normal  No respiratory distress  She has no wheezes  She has no rales  Abdominal: Soft  She exhibits no distension  Musculoskeletal: She exhibits no edema or deformity  Lymphadenopathy:     She has no cervical adenopathy  Neurological: She is alert and oriented to person, place, and time  Skin: Skin is warm   No rash noted  She is not diaphoretic  No erythema  Psychiatric: She has a normal mood and affect  Her behavior is normal    Vitals reviewed

## 2020-03-13 DIAGNOSIS — M54.9 CHRONIC BACK PAIN, UNSPECIFIED BACK LOCATION, UNSPECIFIED BACK PAIN LATERALITY: ICD-10-CM

## 2020-03-13 DIAGNOSIS — G89.29 CHRONIC BACK PAIN, UNSPECIFIED BACK LOCATION, UNSPECIFIED BACK PAIN LATERALITY: ICD-10-CM

## 2020-03-13 RX ORDER — MELOXICAM 7.5 MG/1
7.5 TABLET ORAL DAILY PRN
Qty: 30 TABLET | Refills: 0 | Status: SHIPPED | OUTPATIENT
Start: 2020-03-13 | End: 2020-07-22 | Stop reason: SDUPTHER

## 2020-03-23 DIAGNOSIS — F41.9 ANXIETY: ICD-10-CM

## 2020-03-23 RX ORDER — LORAZEPAM 0.5 MG/1
0.5 TABLET ORAL DAILY PRN
Qty: 30 TABLET | Refills: 0 | Status: SHIPPED | OUTPATIENT
Start: 2020-03-23 | End: 2020-05-29 | Stop reason: SDUPTHER

## 2020-04-02 ENCOUNTER — TELEPHONE (OUTPATIENT)
Dept: INTERNAL MEDICINE CLINIC | Facility: CLINIC | Age: 75
End: 2020-04-02

## 2020-04-03 DIAGNOSIS — G89.29 CHRONIC BACK PAIN, UNSPECIFIED BACK LOCATION, UNSPECIFIED BACK PAIN LATERALITY: ICD-10-CM

## 2020-04-03 DIAGNOSIS — M54.9 CHRONIC BACK PAIN, UNSPECIFIED BACK LOCATION, UNSPECIFIED BACK PAIN LATERALITY: ICD-10-CM

## 2020-04-03 RX ORDER — MELOXICAM 7.5 MG/1
TABLET ORAL
Qty: 30 TABLET | Refills: 0 | OUTPATIENT
Start: 2020-04-03

## 2020-05-29 DIAGNOSIS — F41.9 ANXIETY: ICD-10-CM

## 2020-05-30 RX ORDER — LORAZEPAM 0.5 MG/1
0.5 TABLET ORAL DAILY PRN
Qty: 30 TABLET | Refills: 0 | Status: SHIPPED | OUTPATIENT
Start: 2020-05-30 | End: 2020-07-22 | Stop reason: SDUPTHER

## 2020-06-03 ENCOUNTER — OFFICE VISIT (OUTPATIENT)
Dept: INTERNAL MEDICINE CLINIC | Facility: CLINIC | Age: 75
End: 2020-06-03
Payer: COMMERCIAL

## 2020-06-03 VITALS
BODY MASS INDEX: 23.97 KG/M2 | WEIGHT: 114.2 LBS | OXYGEN SATURATION: 95 % | SYSTOLIC BLOOD PRESSURE: 122 MMHG | HEIGHT: 58 IN | TEMPERATURE: 99.5 F | DIASTOLIC BLOOD PRESSURE: 60 MMHG | HEART RATE: 73 BPM

## 2020-06-03 DIAGNOSIS — H04.123 DRY EYE SYNDROME OF BOTH EYES: ICD-10-CM

## 2020-06-03 DIAGNOSIS — F41.9 ANXIETY: ICD-10-CM

## 2020-06-03 DIAGNOSIS — M85.89 OSTEOPENIA OF MULTIPLE SITES: Primary | ICD-10-CM

## 2020-06-03 DIAGNOSIS — I10 ESSENTIAL HYPERTENSION: ICD-10-CM

## 2020-06-03 DIAGNOSIS — E78.01 FAMILIAL HYPERCHOLESTEROLEMIA: ICD-10-CM

## 2020-06-03 DIAGNOSIS — Z91.09 ENVIRONMENTAL ALLERGIES: ICD-10-CM

## 2020-06-03 PROCEDURE — 99214 OFFICE O/P EST MOD 30 MIN: CPT | Performed by: INTERNAL MEDICINE

## 2020-06-03 PROCEDURE — 3074F SYST BP LT 130 MM HG: CPT | Performed by: INTERNAL MEDICINE

## 2020-06-03 PROCEDURE — 3078F DIAST BP <80 MM HG: CPT | Performed by: INTERNAL MEDICINE

## 2020-06-03 PROCEDURE — 1160F RVW MEDS BY RX/DR IN RCRD: CPT | Performed by: INTERNAL MEDICINE

## 2020-06-03 PROCEDURE — 3008F BODY MASS INDEX DOCD: CPT | Performed by: INTERNAL MEDICINE

## 2020-06-03 PROCEDURE — 1036F TOBACCO NON-USER: CPT | Performed by: INTERNAL MEDICINE

## 2020-06-03 PROCEDURE — 4040F PNEUMOC VAC/ADMIN/RCVD: CPT | Performed by: INTERNAL MEDICINE

## 2020-06-15 ENCOUNTER — TELEPHONE (OUTPATIENT)
Dept: INTERNAL MEDICINE CLINIC | Facility: CLINIC | Age: 75
End: 2020-06-15
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
## 2020-06-16 ENCOUNTER — TELEPHONE (OUTPATIENT)
Dept: INTERNAL MEDICINE CLINIC | Facility: CLINIC | Age: 75
End: 2020-06-16

## 2020-07-01 ENCOUNTER — OFFICE VISIT (OUTPATIENT)
Dept: INTERNAL MEDICINE CLINIC | Age: 75
End: 2020-07-01
Payer: COMMERCIAL

## 2020-07-01 VITALS
SYSTOLIC BLOOD PRESSURE: 110 MMHG | BODY MASS INDEX: 23.97 KG/M2 | DIASTOLIC BLOOD PRESSURE: 68 MMHG | WEIGHT: 114.2 LBS | HEIGHT: 58 IN | HEART RATE: 68 BPM | OXYGEN SATURATION: 98 % | TEMPERATURE: 97.8 F

## 2020-07-01 DIAGNOSIS — I10 ESSENTIAL HYPERTENSION: Primary | ICD-10-CM

## 2020-07-01 DIAGNOSIS — E78.2 MIXED HYPERLIPIDEMIA: ICD-10-CM

## 2020-07-01 PROCEDURE — 3008F BODY MASS INDEX DOCD: CPT | Performed by: INTERNAL MEDICINE

## 2020-07-01 PROCEDURE — 3078F DIAST BP <80 MM HG: CPT | Performed by: INTERNAL MEDICINE

## 2020-07-01 PROCEDURE — 1160F RVW MEDS BY RX/DR IN RCRD: CPT | Performed by: INTERNAL MEDICINE

## 2020-07-01 PROCEDURE — 4040F PNEUMOC VAC/ADMIN/RCVD: CPT | Performed by: INTERNAL MEDICINE

## 2020-07-01 PROCEDURE — 99214 OFFICE O/P EST MOD 30 MIN: CPT | Performed by: INTERNAL MEDICINE

## 2020-07-01 PROCEDURE — 3074F SYST BP LT 130 MM HG: CPT | Performed by: INTERNAL MEDICINE

## 2020-07-01 PROCEDURE — 1036F TOBACCO NON-USER: CPT | Performed by: INTERNAL MEDICINE

## 2020-07-01 NOTE — PROGRESS NOTES
Assessment/Plan:    1  Hyperlipidemia  Lipid profile reviewed  Her HDL is significantly elevated that is why total cholesterol is high  But her LDL is also slightly elevated more than 100  Will continue with present regimen  Continue with healthy lifestyle exercise and weight loss  2  Essential hypertension  Blood pressure is stable on present regimen    3  Status post dog bite over right  Wound is healed completely  Advised for tetanus vaccination and she is going to check with her insurance prior to taking it  Diagnoses and all orders for this visit:    Essential hypertension    Mixed hyperlipidemia               Subjective:          Patient ID: Dom Mustafa is a 69 y o  female  Is here for regular follow-up  Also have blood work done and would like to discuss results  About 2 weeks ago she does have dog bite wound about 2 weeks ago on right hand and she never got any medical attention  As per patient there was some break in the skin and wound is healed completely now  Last status is in 2008  The following portions of the patient's history were reviewed and updated as appropriate: allergies, current medications, past family history, past medical history, past social history, past surgical history and problem list     Review of Systems   Constitutional: Negative for fatigue and fever  HENT: Negative for congestion, ear discharge, ear pain, postnasal drip, sinus pressure, sore throat, tinnitus and trouble swallowing  Eyes: Negative for discharge, itching and visual disturbance  Respiratory: Negative for cough and shortness of breath  Cardiovascular: Negative for chest pain and palpitations  Gastrointestinal: Negative for abdominal pain, diarrhea, nausea and vomiting  Endocrine: Negative for cold intolerance and polyuria  Genitourinary: Negative for difficulty urinating, dysuria and urgency  Musculoskeletal: Negative for arthralgias and neck pain     Skin: Negative for rash  Allergic/Immunologic: Negative for environmental allergies  Neurological: Negative for dizziness, weakness and headaches  Psychiatric/Behavioral: Negative for agitation and behavioral problems  The patient is not nervous/anxious            Past Medical History:   Diagnosis Date    Allergic     Chronically dry eyes, bilateral     Colonic polyp     Disorder of bone and cartilage     Dry eye syndrome, bilateral     Eczema     unspecified type     Environmental allergies     Esophageal reflux     Hypertonicity of bladder     Malignant neoplasm of breast (HCC)     Uterine leiomyoma          Current Outpatient Medications:     aspirin 81 MG tablet, Take 1 tablet by mouth daily, Disp: , Rfl:     BIOTIN PO, Take by mouth, Disp: , Rfl:     Calcium 500 MG tablet, Take 500 mg by mouth daily , Disp: , Rfl:     carboxymethylcellulose (RETAINE CMC) 0 5 % SOLN, 2 drops 2 (two) times a day as needed for dry eyes, Disp: , Rfl:     CHLOROPHYLL PO, , Disp: , Rfl:     Cholecalciferol (VITAMIN D3) 2000 units TABS, Take 1 tablet by mouth daily, Disp: , Rfl:     conjugated estrogens (PREMARIN) vaginal cream, Apply a pea size amount of the estrogen cream to the opening of the vagina three nights per week , Disp: , Rfl:     fexofenadine-pseudoephedrine (ALLEGRA-D)  MG per tablet, , Disp: , Rfl:     fluticasone (FLONASE) 50 mcg/act nasal spray, 2 sprays into each nostril daily, Disp: , Rfl:     gabapentin (NEURONTIN) 100 mg capsule, Take 100 mg by mouth daily , Disp: , Rfl:     Glucosamine-Chondroitin 750-600 MG CHEW, Chew 1 tablet daily, Disp: , Rfl:     HM OMEGA-3-6-9 FATTY ACIDS CAPS, Take 4 capsules by mouth Daily , Disp: , Rfl:     lidocaine (URO-JET) 2 % urethral/mucosal gel, APPLY TO AFFECTED AREA TWICE A DAY AS NEEDED FOR MILD PAIN, Disp: , Rfl: 0    LORazepam (ATIVAN) 0 5 mg tablet, Take 1 tablet (0 5 mg total) by mouth daily as needed for anxiety, Disp: 30 tablet, Rfl: 0    meloxicam (MOBIC) 7 5 mg tablet, Take 1 tablet (7 5 mg total) by mouth daily as needed for mild pain Address additional refills at office visit 12/3/19, Disp: 30 tablet, Rfl: 0    Multiple Vitamins-Minerals (MULTI VITAMIN/MINERALS) TABS, Take 1 tablet by mouth daily, Disp: , Rfl:     neomycin-polymyxin-dexamethasone (MAXITROL) 0 35%-10,000 units/g-0 1%, APPLY A SMALL AMOUNT ON EYELID AT BEDTIME AS DIRECTED, Disp: , Rfl: 1    phenazopyridine (PYRIDIUM) 95 MG tablet, Take 1 tablet by mouth daily, Disp: , Rfl:     Probiotic Product (PROBIOTIC-10) CHEW, Chew 1 tablet daily , Disp: , Rfl:     Red Yeast Rice Extract (GNP RED YEAST RICE) 600 MG CAPS, Take 1 capsule by mouth daily, Disp: , Rfl:     REFRESH 1 4-0 6 % SOLN, Apply 1 drop to eye 4 (four) times a day (both eyes), Disp: , Rfl:     RESTASIS 0 05 % ophthalmic emulsion, 1 DROP TO EACH EYE TWICE A DAY, Disp: 60 mL, Rfl: 0    triamterene-hydrochlorothiazide (MAXZIDE) 75-50 MG per tablet, Take 1 tablet by mouth daily, Disp: 90 tablet, Rfl: 1    verapamil (CALAN) 40 mg tablet, Take 20 mg by mouth daily  , Disp: , Rfl: 3    chlorpheniramine (CHLOR-TRIMETON) 4 MG tablet, Take 1 tablet (4 mg total) by mouth every 6 (six) hours as needed for rhinitis for up to 8 days (Patient not taking: Reported on 6/3/2020), Disp: 30 tablet, Rfl: 0    Lutein 20 MG TABS, , Disp: , Rfl:     Olopatadine HCl 0 6 % SOLN, 1 actuation into each nostril daily at bedtime for 180 days, Disp: 1 Bottle, Rfl: 5    prednisoLONE acetate (PRED FORTE) 1 % ophthalmic suspension, Apply 1 drop to eye every 8 (eight) hours, Disp: , Rfl:     vitamin E, tocopherol, 400 units capsule, Take 1 capsule by mouth daily, Disp: , Rfl:     Allergies   Allergen Reactions    Cat Hair Extract Allergic Rhinitis     Allergy to cat hair dander    Diltiazem Other (See Comments)     Reaction Date: [id number]; Category:  Adverse Reaction;     Dog Epithelium Allergy Skin Test     Dust Mite Extract     Lactose     Levofloxacin     Molds & Smuts     Other Other (See Comments)     Reaction Date: [id number]; Category: Adverse Reaction;     Penicillins Other (See Comments)     Reaction Date: [id number]; Category: Allergy;     Pollen Extract      Category: Allergy;     Shellfish-Derived Products     Short Ragweed Pollen Ext     Tetracycline     Tree Extract     Cefaclor Rash    Moxifloxacin Rash     Reaction Date: [id number]; Category: Adverse Reaction;   Category: Adverse Reaction;     Prednisone Rash     Reaction Date: [id number]; Category: Allergy;     Sulfa Antibiotics Rash and Other (See Comments)     Reaction Date: [id number]; Category: Allergy; Reaction Date: [id number]; Category: Allergy;     Trimethoprim Rash     Other reaction(s): Contact Dermatitis  Reaction Date: [id number]; Category: Adverse Reaction;        Social History   Past Surgical History:   Procedure Laterality Date    BREAST RECONSTRUCTION Right     with implant prosthesis right breast / 2004   [address] Right     1998    MASTECTOMY, RADICAL Right      Family History   Problem Relation Age of Onset    Heart disease Mother         ischemic     Heart disease Father         ischemic     Kidney disease Father     Uterine cancer Sister     Liver cancer Sister     Lymphoma Sister     Liver cancer Maternal Grandmother     Colon cancer Paternal Aunt        Objective:  /68 (BP Location: Left arm, Patient Position: Sitting, Cuff Size: Standard)   Pulse 68   Temp 97 8 °F (36 6 °C) (Tympanic)   Ht 4' 10 27" (1 48 m) Comment: shoes  off  Wt 51 8 kg (114 lb 3 2 oz) Comment: shoes off  SpO2 98%   BMI 23 65 kg/m²   Body mass index is 23 65 kg/m²  Physical Exam   Constitutional: She appears well-developed  HENT:   Head: Normocephalic     Right Ear: External ear normal    Left Ear: External ear normal    Mouth/Throat: Oropharynx is clear and moist    Eyes: Pupils are equal, round, and reactive to light  No scleral icterus  Neck: Normal range of motion  Neck supple  No tracheal deviation present  No thyromegaly present  Cardiovascular: Normal rate, regular rhythm and normal heart sounds  Pulmonary/Chest: Effort normal and breath sounds normal  No respiratory distress  She exhibits no tenderness  Abdominal: Soft  Bowel sounds are normal  She exhibits no mass  There is no tenderness  Musculoskeletal: Normal range of motion  Lymphadenopathy:     She has no cervical adenopathy  Neurological: She is alert  No cranial nerve deficit  Skin: Skin is warm  Psychiatric: She has a normal mood and affect

## 2020-07-22 DIAGNOSIS — F41.9 ANXIETY: ICD-10-CM

## 2020-07-22 DIAGNOSIS — G89.29 CHRONIC BACK PAIN, UNSPECIFIED BACK LOCATION, UNSPECIFIED BACK PAIN LATERALITY: ICD-10-CM

## 2020-07-22 DIAGNOSIS — M54.9 CHRONIC BACK PAIN, UNSPECIFIED BACK LOCATION, UNSPECIFIED BACK PAIN LATERALITY: ICD-10-CM

## 2020-07-23 RX ORDER — MELOXICAM 7.5 MG/1
7.5 TABLET ORAL DAILY PRN
Qty: 30 TABLET | Refills: 0 | Status: SHIPPED | OUTPATIENT
Start: 2020-07-23 | End: 2020-09-29 | Stop reason: SDUPTHER

## 2020-07-23 RX ORDER — LORAZEPAM 0.5 MG/1
0.5 TABLET ORAL DAILY PRN
Qty: 30 TABLET | Refills: 0 | Status: SHIPPED | OUTPATIENT
Start: 2020-07-23 | End: 2020-09-29 | Stop reason: SDUPTHER

## 2020-09-29 DIAGNOSIS — E78.01 FAMILIAL HYPERCHOLESTEROLEMIA: ICD-10-CM

## 2020-09-29 DIAGNOSIS — G89.29 CHRONIC BACK PAIN, UNSPECIFIED BACK LOCATION, UNSPECIFIED BACK PAIN LATERALITY: ICD-10-CM

## 2020-09-29 DIAGNOSIS — F41.9 ANXIETY: ICD-10-CM

## 2020-09-29 DIAGNOSIS — M54.9 CHRONIC BACK PAIN, UNSPECIFIED BACK LOCATION, UNSPECIFIED BACK PAIN LATERALITY: ICD-10-CM

## 2020-09-29 RX ORDER — TRIAMTERENE AND HYDROCHLOROTHIAZIDE 75; 50 MG/1; MG/1
1 TABLET ORAL DAILY
Qty: 90 TABLET | Refills: 1 | Status: SHIPPED | OUTPATIENT
Start: 2020-09-29

## 2020-09-29 RX ORDER — MELOXICAM 7.5 MG/1
7.5 TABLET ORAL DAILY PRN
Qty: 30 TABLET | Refills: 0 | Status: SHIPPED | OUTPATIENT
Start: 2020-09-29 | End: 2020-12-30 | Stop reason: SDUPTHER

## 2020-09-29 RX ORDER — LORAZEPAM 0.5 MG/1
0.5 TABLET ORAL DAILY PRN
Qty: 30 TABLET | Refills: 0 | Status: SHIPPED | OUTPATIENT
Start: 2020-09-29 | End: 2020-11-16 | Stop reason: SDUPTHER

## 2020-10-08 ENCOUNTER — TELEPHONE (OUTPATIENT)
Dept: INTERNAL MEDICINE CLINIC | Facility: CLINIC | Age: 75
End: 2020-10-08

## 2020-10-22 ENCOUNTER — TELEMEDICINE (OUTPATIENT)
Dept: INTERNAL MEDICINE CLINIC | Facility: CLINIC | Age: 75
End: 2020-10-22
Payer: COMMERCIAL

## 2020-10-22 VITALS — HEIGHT: 58 IN | TEMPERATURE: 101 F | WEIGHT: 114 LBS | BODY MASS INDEX: 23.93 KG/M2

## 2020-10-22 DIAGNOSIS — Z11.59 ENCOUNTER FOR SCREENING FOR OTHER VIRAL DISEASES: ICD-10-CM

## 2020-10-22 DIAGNOSIS — Z11.59 ENCOUNTER FOR SCREENING FOR OTHER VIRAL DISEASES: Primary | ICD-10-CM

## 2020-10-22 DIAGNOSIS — G89.29 CHRONIC BACK PAIN, UNSPECIFIED BACK LOCATION, UNSPECIFIED BACK PAIN LATERALITY: ICD-10-CM

## 2020-10-22 DIAGNOSIS — M54.9 CHRONIC BACK PAIN, UNSPECIFIED BACK LOCATION, UNSPECIFIED BACK PAIN LATERALITY: ICD-10-CM

## 2020-10-22 PROCEDURE — U0003 INFECTIOUS AGENT DETECTION BY NUCLEIC ACID (DNA OR RNA); SEVERE ACUTE RESPIRATORY SYNDROME CORONAVIRUS 2 (SARS-COV-2) (CORONAVIRUS DISEASE [COVID-19]), AMPLIFIED PROBE TECHNIQUE, MAKING USE OF HIGH THROUGHPUT TECHNOLOGIES AS DESCRIBED BY CMS-2020-01-R: HCPCS | Performed by: NURSE PRACTITIONER

## 2020-10-22 PROCEDURE — 99214 OFFICE O/P EST MOD 30 MIN: CPT | Performed by: NURSE PRACTITIONER

## 2020-10-22 RX ORDER — MELOXICAM 7.5 MG/1
7.5 TABLET ORAL DAILY PRN
Qty: 30 TABLET | Refills: 0 | OUTPATIENT
Start: 2020-10-22

## 2020-10-24 ENCOUNTER — TELEPHONE (OUTPATIENT)
Dept: URGENT CARE | Age: 75
End: 2020-10-24

## 2020-10-24 LAB — SARS-COV-2 RNA SPEC QL NAA+PROBE: NOT DETECTED

## 2020-11-16 DIAGNOSIS — F41.9 ANXIETY: ICD-10-CM

## 2020-11-16 DIAGNOSIS — I10 ESSENTIAL HYPERTENSION: ICD-10-CM

## 2020-11-16 DIAGNOSIS — E78.01 FAMILIAL HYPERCHOLESTEROLEMIA: Primary | ICD-10-CM

## 2020-11-16 DIAGNOSIS — Z88.9 MULTIPLE ALLERGIES: ICD-10-CM

## 2020-11-16 DIAGNOSIS — E78.2 MIXED HYPERLIPIDEMIA: ICD-10-CM

## 2020-11-16 RX ORDER — LORAZEPAM 0.5 MG/1
0.5 TABLET ORAL DAILY PRN
Qty: 30 TABLET | Refills: 0 | Status: SHIPPED | OUTPATIENT
Start: 2020-11-16 | End: 2020-12-30 | Stop reason: SDUPTHER

## 2020-11-17 ENCOUNTER — TELEMEDICINE (OUTPATIENT)
Dept: INTERNAL MEDICINE CLINIC | Facility: CLINIC | Age: 75
End: 2020-11-17
Payer: COMMERCIAL

## 2020-11-17 ENCOUNTER — TELEPHONE (OUTPATIENT)
Dept: INTERNAL MEDICINE CLINIC | Facility: CLINIC | Age: 75
End: 2020-11-17

## 2020-11-17 VITALS — TEMPERATURE: 98.5 F

## 2020-11-17 DIAGNOSIS — Z20.822 EXPOSURE TO COVID-19 VIRUS: Primary | ICD-10-CM

## 2020-11-17 PROCEDURE — 1036F TOBACCO NON-USER: CPT | Performed by: INTERNAL MEDICINE

## 2020-11-17 PROCEDURE — 1160F RVW MEDS BY RX/DR IN RCRD: CPT | Performed by: INTERNAL MEDICINE

## 2020-11-17 PROCEDURE — 99213 OFFICE O/P EST LOW 20 MIN: CPT | Performed by: INTERNAL MEDICINE

## 2020-11-18 PROCEDURE — U0003 INFECTIOUS AGENT DETECTION BY NUCLEIC ACID (DNA OR RNA); SEVERE ACUTE RESPIRATORY SYNDROME CORONAVIRUS 2 (SARS-COV-2) (CORONAVIRUS DISEASE [COVID-19]), AMPLIFIED PROBE TECHNIQUE, MAKING USE OF HIGH THROUGHPUT TECHNOLOGIES AS DESCRIBED BY CMS-2020-01-R: HCPCS | Performed by: INTERNAL MEDICINE

## 2020-11-21 LAB — SARS-COV-2 RNA SPEC QL NAA+PROBE: NOT DETECTED

## 2020-11-22 ENCOUNTER — TELEPHONE (OUTPATIENT)
Dept: URGENT CARE | Age: 75
End: 2020-11-22

## 2020-12-02 ENCOUNTER — OFFICE VISIT (OUTPATIENT)
Dept: INTERNAL MEDICINE CLINIC | Facility: CLINIC | Age: 75
End: 2020-12-02
Payer: COMMERCIAL

## 2020-12-02 VITALS
HEART RATE: 76 BPM | DIASTOLIC BLOOD PRESSURE: 72 MMHG | OXYGEN SATURATION: 98 % | WEIGHT: 108.8 LBS | TEMPERATURE: 98.4 F | HEIGHT: 58 IN | BODY MASS INDEX: 22.84 KG/M2 | SYSTOLIC BLOOD PRESSURE: 136 MMHG

## 2020-12-02 DIAGNOSIS — I10 ESSENTIAL HYPERTENSION: ICD-10-CM

## 2020-12-02 DIAGNOSIS — M85.89 OSTEOPENIA OF MULTIPLE SITES: Primary | ICD-10-CM

## 2020-12-02 DIAGNOSIS — Z23 FLU VACCINE NEED: ICD-10-CM

## 2020-12-02 PROCEDURE — 3078F DIAST BP <80 MM HG: CPT | Performed by: INTERNAL MEDICINE

## 2020-12-02 PROCEDURE — 90682 RIV4 VACC RECOMBINANT DNA IM: CPT

## 2020-12-02 PROCEDURE — 3075F SYST BP GE 130 - 139MM HG: CPT | Performed by: INTERNAL MEDICINE

## 2020-12-02 PROCEDURE — 3288F FALL RISK ASSESSMENT DOCD: CPT | Performed by: INTERNAL MEDICINE

## 2020-12-02 PROCEDURE — T1015 CLINIC SERVICE: HCPCS | Performed by: INTERNAL MEDICINE

## 2020-12-02 PROCEDURE — G0008 ADMIN INFLUENZA VIRUS VAC: HCPCS

## 2020-12-02 PROCEDURE — 1036F TOBACCO NON-USER: CPT | Performed by: INTERNAL MEDICINE

## 2020-12-02 PROCEDURE — 99215 OFFICE O/P EST HI 40 MIN: CPT | Performed by: INTERNAL MEDICINE

## 2020-12-02 PROCEDURE — 1160F RVW MEDS BY RX/DR IN RCRD: CPT | Performed by: INTERNAL MEDICINE

## 2020-12-02 PROCEDURE — 1101F PT FALLS ASSESS-DOCD LE1/YR: CPT | Performed by: INTERNAL MEDICINE

## 2020-12-09 ENCOUNTER — TELEMEDICINE (OUTPATIENT)
Dept: INTERNAL MEDICINE CLINIC | Facility: CLINIC | Age: 75
End: 2020-12-09

## 2020-12-09 VITALS — HEIGHT: 58 IN | BODY MASS INDEX: 22.92 KG/M2 | WEIGHT: 109.2 LBS | TEMPERATURE: 98.3 F

## 2020-12-09 DIAGNOSIS — Z00.00 MEDICARE ANNUAL WELLNESS VISIT, SUBSEQUENT: Primary | ICD-10-CM

## 2020-12-09 PROCEDURE — 3008F BODY MASS INDEX DOCD: CPT | Performed by: INTERNAL MEDICINE

## 2020-12-30 DIAGNOSIS — M54.9 CHRONIC BACK PAIN, UNSPECIFIED BACK LOCATION, UNSPECIFIED BACK PAIN LATERALITY: ICD-10-CM

## 2020-12-30 DIAGNOSIS — G89.29 CHRONIC BACK PAIN, UNSPECIFIED BACK LOCATION, UNSPECIFIED BACK PAIN LATERALITY: ICD-10-CM

## 2020-12-30 DIAGNOSIS — F41.9 ANXIETY: ICD-10-CM

## 2020-12-30 RX ORDER — LORAZEPAM 0.5 MG/1
0.5 TABLET ORAL DAILY PRN
Qty: 30 TABLET | Refills: 0 | Status: SHIPPED | OUTPATIENT
Start: 2020-12-30

## 2020-12-30 RX ORDER — MELOXICAM 7.5 MG/1
7.5 TABLET ORAL DAILY PRN
Qty: 30 TABLET | Refills: 2 | Status: SHIPPED | OUTPATIENT
Start: 2020-12-30

## 2021-01-07 ENCOUNTER — TELEPHONE (OUTPATIENT)
Dept: INTERNAL MEDICINE CLINIC | Facility: CLINIC | Age: 76
End: 2021-01-07

## 2021-01-07 NOTE — TELEPHONE ENCOUNTER
FYI-Patient will no longer be with our practice , because of change of insurance from her  to 42 [address]

## 2021-02-22 ENCOUNTER — TRANSCRIBE ORDERS (OUTPATIENT)
Dept: ADMINISTRATIVE | Facility: HOSPITAL | Age: 76
End: 2021-02-22

## 2021-02-22 DIAGNOSIS — M85.80 OSTEOPENIA, UNSPECIFIED LOCATION: Primary | ICD-10-CM

## 2021-04-07 ENCOUNTER — HOSPITAL ENCOUNTER (OUTPATIENT)
Dept: RADIOLOGY | Age: 76
Discharge: HOME | End: 2021-04-07
Payer: COMMERCIAL

## 2021-04-07 DIAGNOSIS — M85.80 OSTEOPENIA, UNSPECIFIED LOCATION: ICD-10-CM

## 2021-04-07 PROCEDURE — 77080 DXA BONE DENSITY AXIAL: CPT

## 2024-11-07 ENCOUNTER — PROBLEM (OUTPATIENT)
Dept: URBAN - METROPOLITAN AREA CLINIC 6 | Facility: CLINIC | Age: 79
End: 2024-11-07

## 2024-11-07 DIAGNOSIS — H35.3132: ICD-10-CM

## 2024-11-07 DIAGNOSIS — H16.223: ICD-10-CM

## 2024-11-07 DIAGNOSIS — H25.13: ICD-10-CM

## 2024-11-07 PROCEDURE — 99203 OFFICE O/P NEW LOW 30 MIN: CPT

## 2024-11-07 ASSESSMENT — VISUAL ACUITY
OS_GLARE: 20/70
OD_GLARE: 20/80
OS_CC: 20/50
OU_CC: J3
OD_CC: 20/50

## 2024-11-07 ASSESSMENT — TONOMETRY
OS_IOP_MMHG: 10
OD_IOP_MMHG: 6

## 2024-11-13 ENCOUNTER — PROBLEM (OUTPATIENT)
Dept: URBAN - METROPOLITAN AREA CLINIC 6 | Facility: CLINIC | Age: 79
End: 2024-11-13

## 2024-11-13 DIAGNOSIS — H16.223: ICD-10-CM

## 2024-11-13 PROCEDURE — 92012 INTRM OPH EXAM EST PATIENT: CPT

## 2024-11-13 ASSESSMENT — TONOMETRY
OD_IOP_MMHG: 9
OS_IOP_MMHG: 9

## 2024-11-13 ASSESSMENT — VISUAL ACUITY
OS_CC: 20/40
OD_CC: 20/30

## (undated) RX ORDER — PREDNISOLONE ACETATE 10 MG/ML: 1 SUSPENSION/ DROPS OPHTHALMIC TWICE A DAY